# Patient Record
Sex: MALE | NOT HISPANIC OR LATINO | URBAN - METROPOLITAN AREA
[De-identification: names, ages, dates, MRNs, and addresses within clinical notes are randomized per-mention and may not be internally consistent; named-entity substitution may affect disease eponyms.]

---

## 2017-09-29 ENCOUNTER — EMERGENCY (EMERGENCY)
Facility: HOSPITAL | Age: 52
LOS: 1 days | Discharge: HOME | End: 2017-09-29
Attending: EMERGENCY MEDICINE
Payer: SELF-PAY

## 2017-09-29 VITALS
SYSTOLIC BLOOD PRESSURE: 106 MMHG | HEART RATE: 97 BPM | OXYGEN SATURATION: 96 % | RESPIRATION RATE: 16 BRPM | TEMPERATURE: 98 F | WEIGHT: 169.98 LBS | HEIGHT: 71 IN | DIASTOLIC BLOOD PRESSURE: 75 MMHG

## 2017-09-29 DIAGNOSIS — Z72.89 OTHER PROBLEMS RELATED TO LIFESTYLE: ICD-10-CM

## 2017-09-29 DIAGNOSIS — R20.0 ANESTHESIA OF SKIN: ICD-10-CM

## 2017-09-29 DIAGNOSIS — F17.210 NICOTINE DEPENDENCE, CIGARETTES, UNCOMPLICATED: ICD-10-CM

## 2017-09-29 PROBLEM — Z00.00 ENCOUNTER FOR PREVENTIVE HEALTH EXAMINATION: Status: ACTIVE | Noted: 2017-09-29

## 2017-09-29 LAB
ANION GAP SERPL CALC-SCNC: 7 MMOL/L — SIGNIFICANT CHANGE UP (ref 5–17)
BASOPHILS # BLD AUTO: 0.1 K/UL — SIGNIFICANT CHANGE UP (ref 0–0.2)
BASOPHILS NFR BLD AUTO: 1.4 % — SIGNIFICANT CHANGE UP (ref 0–2)
BUN SERPL-MCNC: 8 MG/DL — SIGNIFICANT CHANGE UP (ref 7–18)
CALCIUM SERPL-MCNC: 9.1 MG/DL — SIGNIFICANT CHANGE UP (ref 8.4–10.5)
CHLORIDE SERPL-SCNC: 105 MMOL/L — SIGNIFICANT CHANGE UP (ref 96–108)
CO2 SERPL-SCNC: 28 MMOL/L — SIGNIFICANT CHANGE UP (ref 22–31)
CREAT SERPL-MCNC: 0.76 MG/DL — SIGNIFICANT CHANGE UP (ref 0.5–1.3)
EOSINOPHIL # BLD AUTO: 0.4 K/UL — SIGNIFICANT CHANGE UP (ref 0–0.5)
EOSINOPHIL NFR BLD AUTO: 4.2 % — SIGNIFICANT CHANGE UP (ref 0–6)
GLUCOSE SERPL-MCNC: 104 MG/DL — HIGH (ref 70–99)
HCT VFR BLD CALC: 47.2 % — SIGNIFICANT CHANGE UP (ref 39–50)
HGB BLD-MCNC: 15.3 G/DL — SIGNIFICANT CHANGE UP (ref 13–17)
LYMPHOCYTES # BLD AUTO: 3 K/UL — SIGNIFICANT CHANGE UP (ref 1–3.3)
LYMPHOCYTES # BLD AUTO: 34.9 % — SIGNIFICANT CHANGE UP (ref 13–44)
MAGNESIUM SERPL-MCNC: 2 MG/DL — SIGNIFICANT CHANGE UP (ref 1.6–2.6)
MCHC RBC-ENTMCNC: 31.4 PG — SIGNIFICANT CHANGE UP (ref 27–34)
MCHC RBC-ENTMCNC: 32.4 GM/DL — SIGNIFICANT CHANGE UP (ref 32–36)
MCV RBC AUTO: 96.7 FL — SIGNIFICANT CHANGE UP (ref 80–100)
MONOCYTES # BLD AUTO: 0.6 K/UL — SIGNIFICANT CHANGE UP (ref 0–0.9)
MONOCYTES NFR BLD AUTO: 7.2 % — SIGNIFICANT CHANGE UP (ref 2–14)
NEUTROPHILS # BLD AUTO: 4.4 K/UL — SIGNIFICANT CHANGE UP (ref 1.8–7.4)
NEUTROPHILS NFR BLD AUTO: 52.4 % — SIGNIFICANT CHANGE UP (ref 43–77)
PHOSPHATE SERPL-MCNC: 2.9 MG/DL — SIGNIFICANT CHANGE UP (ref 2.5–4.5)
PLATELET # BLD AUTO: 196 K/UL — SIGNIFICANT CHANGE UP (ref 150–400)
POTASSIUM SERPL-MCNC: 3.8 MMOL/L — SIGNIFICANT CHANGE UP (ref 3.5–5.3)
POTASSIUM SERPL-SCNC: 3.8 MMOL/L — SIGNIFICANT CHANGE UP (ref 3.5–5.3)
RBC # BLD: 4.88 M/UL — SIGNIFICANT CHANGE UP (ref 4.2–5.8)
RBC # FLD: 12.6 % — SIGNIFICANT CHANGE UP (ref 10.3–14.5)
SODIUM SERPL-SCNC: 140 MMOL/L — SIGNIFICANT CHANGE UP (ref 135–145)
WBC # BLD: 8.5 K/UL — SIGNIFICANT CHANGE UP (ref 3.8–10.5)
WBC # FLD AUTO: 8.5 K/UL — SIGNIFICANT CHANGE UP (ref 3.8–10.5)

## 2017-09-29 PROCEDURE — 85027 COMPLETE CBC AUTOMATED: CPT

## 2017-09-29 PROCEDURE — 99053 MED SERV 10PM-8AM 24 HR FAC: CPT

## 2017-09-29 PROCEDURE — 83735 ASSAY OF MAGNESIUM: CPT

## 2017-09-29 PROCEDURE — 84100 ASSAY OF PHOSPHORUS: CPT

## 2017-09-29 PROCEDURE — 99284 EMERGENCY DEPT VISIT MOD MDM: CPT | Mod: 25

## 2017-09-29 PROCEDURE — 70450 CT HEAD/BRAIN W/O DYE: CPT

## 2017-09-29 PROCEDURE — 70450 CT HEAD/BRAIN W/O DYE: CPT | Mod: 26

## 2017-09-29 PROCEDURE — 93005 ELECTROCARDIOGRAM TRACING: CPT

## 2017-09-29 PROCEDURE — 80048 BASIC METABOLIC PNL TOTAL CA: CPT

## 2017-09-29 RX ADMIN — Medication 40 MILLIGRAM(S): at 22:46

## 2017-09-29 NOTE — ED PROVIDER NOTE - OBJECTIVE STATEMENT
55 y/o M pt with no significant PMHx presents to ED c/o paresthesia to right arm x 2 days. Pt admits to drinking 1-2 beers a day. Pt denies fever, chills, shortness of breath, cough, chest pain, palpitations, nausea, vomiting, diarrhea, abd pain, dysuria, urinary frequency, hematuria, recent neck injury, or any other complaints. NKDA.

## 2017-09-29 NOTE — ED PROVIDER NOTE - CHPI ED SYMPTOMS NEG
on fever, chills, shortness of breath, cough, chest pain, palpitations, no nausea, no vomiting, no diarrhea, no abd pain, no dysuria, no urinary frequency, no hematuria

## 2017-09-29 NOTE — ED PROVIDER NOTE - PHYSICAL EXAMINATION
Kernig and Brudzinski signs area negative, no petechiae, no photophobia, no dysarthria, no facial asymmetry, no focal deficits. distal radial and ulnar pulses intact, cap refill < 2 seconds, full range of motion of arm +elbow flexion/extension/supination and pronation intact, +flexion and extension of all digits at DIP and PIP. Full strength and sensation intact.

## 2017-09-30 VITALS
HEART RATE: 81 BPM | DIASTOLIC BLOOD PRESSURE: 90 MMHG | OXYGEN SATURATION: 98 % | TEMPERATURE: 98 F | RESPIRATION RATE: 18 BRPM | SYSTOLIC BLOOD PRESSURE: 149 MMHG

## 2017-10-03 ENCOUNTER — INPATIENT (INPATIENT)
Facility: HOSPITAL | Age: 52
LOS: 5 days | Discharge: HOME | DRG: 60 | End: 2017-10-09
Attending: INTERNAL MEDICINE | Admitting: INTERNAL MEDICINE
Payer: MEDICAID

## 2017-10-03 VITALS
SYSTOLIC BLOOD PRESSURE: 127 MMHG | TEMPERATURE: 98 F | WEIGHT: 169.98 LBS | HEART RATE: 108 BPM | DIASTOLIC BLOOD PRESSURE: 97 MMHG | HEIGHT: 71.5 IN | OXYGEN SATURATION: 97 % | RESPIRATION RATE: 16 BRPM

## 2017-10-03 DIAGNOSIS — Z29.9 ENCOUNTER FOR PROPHYLACTIC MEASURES, UNSPECIFIED: ICD-10-CM

## 2017-10-03 DIAGNOSIS — R27.8 OTHER LACK OF COORDINATION: ICD-10-CM

## 2017-10-03 DIAGNOSIS — R20.2 PARESTHESIA OF SKIN: ICD-10-CM

## 2017-10-03 LAB
ALBUMIN SERPL ELPH-MCNC: 4.3 G/DL — SIGNIFICANT CHANGE UP (ref 3.5–5)
ALP SERPL-CCNC: 63 U/L — SIGNIFICANT CHANGE UP (ref 40–120)
ALT FLD-CCNC: 40 U/L DA — SIGNIFICANT CHANGE UP (ref 10–60)
ANION GAP SERPL CALC-SCNC: 8 MMOL/L — SIGNIFICANT CHANGE UP (ref 5–17)
APTT BLD: 28.7 SEC — SIGNIFICANT CHANGE UP (ref 27.5–37.4)
AST SERPL-CCNC: 29 U/L — SIGNIFICANT CHANGE UP (ref 10–40)
BASOPHILS # BLD AUTO: 0.1 K/UL — SIGNIFICANT CHANGE UP (ref 0–0.2)
BASOPHILS NFR BLD AUTO: 1.2 % — SIGNIFICANT CHANGE UP (ref 0–2)
BILIRUB SERPL-MCNC: 0.3 MG/DL — SIGNIFICANT CHANGE UP (ref 0.2–1.2)
BUN SERPL-MCNC: 9 MG/DL — SIGNIFICANT CHANGE UP (ref 7–18)
CALCIUM SERPL-MCNC: 9.1 MG/DL — SIGNIFICANT CHANGE UP (ref 8.4–10.5)
CHLORIDE SERPL-SCNC: 101 MMOL/L — SIGNIFICANT CHANGE UP (ref 96–108)
CK SERPL-CCNC: 52 U/L — SIGNIFICANT CHANGE UP (ref 35–232)
CO2 SERPL-SCNC: 28 MMOL/L — SIGNIFICANT CHANGE UP (ref 22–31)
CREAT SERPL-MCNC: 0.65 MG/DL — SIGNIFICANT CHANGE UP (ref 0.5–1.3)
EOSINOPHIL # BLD AUTO: 0 K/UL — SIGNIFICANT CHANGE UP (ref 0–0.5)
EOSINOPHIL NFR BLD AUTO: 0.4 % — SIGNIFICANT CHANGE UP (ref 0–6)
GLUCOSE SERPL-MCNC: 85 MG/DL — SIGNIFICANT CHANGE UP (ref 70–99)
HCT VFR BLD CALC: 47 % — SIGNIFICANT CHANGE UP (ref 39–50)
HGB BLD-MCNC: 15.6 G/DL — SIGNIFICANT CHANGE UP (ref 13–17)
INR BLD: 0.99 RATIO — SIGNIFICANT CHANGE UP (ref 0.88–1.16)
LYMPHOCYTES # BLD AUTO: 19.9 % — SIGNIFICANT CHANGE UP (ref 13–44)
LYMPHOCYTES # BLD AUTO: 2.1 K/UL — SIGNIFICANT CHANGE UP (ref 1–3.3)
MAGNESIUM SERPL-MCNC: 1.9 MG/DL — SIGNIFICANT CHANGE UP (ref 1.6–2.6)
MCHC RBC-ENTMCNC: 31.8 PG — SIGNIFICANT CHANGE UP (ref 27–34)
MCHC RBC-ENTMCNC: 33.2 GM/DL — SIGNIFICANT CHANGE UP (ref 32–36)
MCV RBC AUTO: 96.1 FL — SIGNIFICANT CHANGE UP (ref 80–100)
MONOCYTES # BLD AUTO: 0.9 K/UL — SIGNIFICANT CHANGE UP (ref 0–0.9)
MONOCYTES NFR BLD AUTO: 9 % — SIGNIFICANT CHANGE UP (ref 2–14)
NEUTROPHILS # BLD AUTO: 7.3 K/UL — SIGNIFICANT CHANGE UP (ref 1.8–7.4)
NEUTROPHILS NFR BLD AUTO: 69.6 % — SIGNIFICANT CHANGE UP (ref 43–77)
PLATELET # BLD AUTO: 215 K/UL — SIGNIFICANT CHANGE UP (ref 150–400)
POTASSIUM SERPL-MCNC: 4 MMOL/L — SIGNIFICANT CHANGE UP (ref 3.5–5.3)
POTASSIUM SERPL-SCNC: 4 MMOL/L — SIGNIFICANT CHANGE UP (ref 3.5–5.3)
PROT SERPL-MCNC: 7.7 G/DL — SIGNIFICANT CHANGE UP (ref 6–8.3)
PROTHROM AB SERPL-ACNC: 10.8 SEC — SIGNIFICANT CHANGE UP (ref 9.8–12.7)
RBC # BLD: 4.89 M/UL — SIGNIFICANT CHANGE UP (ref 4.2–5.8)
RBC # FLD: 12.6 % — SIGNIFICANT CHANGE UP (ref 10.3–14.5)
SODIUM SERPL-SCNC: 137 MMOL/L — SIGNIFICANT CHANGE UP (ref 135–145)
TROPONIN I SERPL-MCNC: <0.015 NG/ML — SIGNIFICANT CHANGE UP (ref 0–0.04)
WBC # BLD: 10.5 K/UL — SIGNIFICANT CHANGE UP (ref 3.8–10.5)
WBC # FLD AUTO: 10.5 K/UL — SIGNIFICANT CHANGE UP (ref 3.8–10.5)

## 2017-10-03 PROCEDURE — 70450 CT HEAD/BRAIN W/O DYE: CPT | Mod: 26

## 2017-10-03 PROCEDURE — 99285 EMERGENCY DEPT VISIT HI MDM: CPT

## 2017-10-03 PROCEDURE — 71020: CPT | Mod: 26

## 2017-10-03 RX ORDER — ASPIRIN/CALCIUM CARB/MAGNESIUM 324 MG
162 TABLET ORAL ONCE
Qty: 0 | Refills: 0 | Status: COMPLETED | OUTPATIENT
Start: 2017-10-03 | End: 2017-10-03

## 2017-10-03 RX ORDER — ASPIRIN/CALCIUM CARB/MAGNESIUM 324 MG
81 TABLET ORAL DAILY
Qty: 0 | Refills: 0 | Status: DISCONTINUED | OUTPATIENT
Start: 2017-10-03 | End: 2017-10-09

## 2017-10-03 RX ORDER — ATORVASTATIN CALCIUM 80 MG/1
40 TABLET, FILM COATED ORAL AT BEDTIME
Qty: 0 | Refills: 0 | Status: DISCONTINUED | OUTPATIENT
Start: 2017-10-03 | End: 2017-10-09

## 2017-10-03 RX ADMIN — Medication 162 MILLIGRAM(S): at 19:24

## 2017-10-03 RX ADMIN — ATORVASTATIN CALCIUM 40 MILLIGRAM(S): 80 TABLET, FILM COATED ORAL at 23:03

## 2017-10-03 NOTE — H&P ADULT - ASSESSMENT
53 y/o M with no significant PMHx presents to ED c/o worsening numbness, tingling and weakness on R side x 3 days. Pt was seen here 3 days ago for tingling in R hand only and was discharged w/ steroid medication he has been compliant with. Pt now has numbness and tingling in his R arm, shoulder, side, and thigh as well w/ some weakness in his R hand - pt is unable to make a fist.    IN ER troponin T1 normal, EKG normal sinus, CBC, BMP normal, CT head here is slight asymmetry of the lateral ventricles with the left lateral ventricle being slightly cabrera than the right lateral ventricle. No acute blood collections or subarachnoid hemorrhages are evident.   patient was admitted to tele to r/o stroke

## 2017-10-03 NOTE — ED ADULT TRIAGE NOTE - CHIEF COMPLAINT QUOTE
C/o "worsening right arm numbness and I feel it more in my side and the numbness is in my legs more now since I got up at noon, I was here last Friday for same reason. It started last Thursday."

## 2017-10-03 NOTE — ED PROVIDER NOTE - MEDICAL DECISION MAKING DETAILS
51 y/o M presenting dysmetria and paresthesias, worsening, pt was here 3 days ago. Admit to TELE for stroke evaluation. Pt's last drink was at midnight. 53 y/o M presenting with paresthesia, worsening, pt was here 3 days ago, now with dysmetria on exam. Admit to TELE for stroke evaluation. Pt's last drink was at midnight. ddx also includes alcohol induced neuropathy

## 2017-10-03 NOTE — ED PROVIDER NOTE - OBJECTIVE STATEMENT
51 y/o M with no significant PMHx presents to ED c/o worsening numbness, tingling and weakness on R side x 3 days. Pt was seen here 3 days ago for tingling in R hand only and was discharged w/ medication he has been compliant with. Pt now has numbness and tingling in his R arm, shoulder, side, and thigh as well w/ some weakness in his R hand - pt is unable to make a fist. Pt denies any trauma. Denies any chest pain, SOB, incontinence, back pain, neck pain, fever, visual changes, facial involvement or any other complaints. 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 Denies ever having had symptoms of withdrawal. 51 y/o M with no significant PMHx presents to ED c/o worsening numbness, tingling and weakness on R side x 3 days. Pt was seen here 3 days ago for tingling in R hand only and was discharged w/ steroid medication he has been compliant with. Pt now has numbness and tingling in his R arm, shoulder, side, and thigh as well w/ some weakness in his R hand - pt is unable to make a fist. Pt denies any trauma. Denies any chest pain, SOB, incontinence, back pain, neck pain, fever, visual changes, facial involvement or any other complaints. 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 Denies ever having had symptoms of withdrawal.

## 2017-10-03 NOTE — ED PROVIDER NOTE - CHPI ED SYMPTOMS NEG
no back pain, no neck pain, no chest pain, no shortness of breath, no incontinence, no visual changes/no fever

## 2017-10-03 NOTE — H&P ADULT - ATTENDING COMMENTS
Seen and examined. I feel the same. MRI and MRA Head noted. MRI cervical spine and Neurology consult pending.

## 2017-10-03 NOTE — ED ADULT NURSE REASSESSMENT NOTE - NS ED NURSE REASSESS COMMENT FT1
Pt. stated that he feels the same. Pt. is stable for transport to [address] [place] [address]. Night medication to be given by MERY Sheth. No signs of distress noted. No complaints voiced.

## 2017-10-03 NOTE — H&P ADULT - HISTORY OF PRESENT ILLNESS
53 y/o M with no significant PMHx presents to ED c/o worsening numbness, tingling and weakness on R side x 3 days. Pt was seen here 3 days ago for tingling in R hand ct head was neg for stroke patient was  discharged w/ steroid medication he has been compliant with. Pt now has numbness and tingling in his R arm, shoulder, side, and thigh as well w/ some weakness in his R hand - pt is unable to make a fist. patient had similar episode 5 years ago, resolved after steroids.  Pt denies any trauma. Denies any chest pain, SOB, incontinence, back pain, neck pain, fever, visual changes, facial involvement or any other complaints. 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 patient is an active smoker 1 PPD since 10 years Denies ever having had symptoms of withdrawal.

## 2017-10-03 NOTE — H&P ADULT - NEUROLOGICAL DETAILS
responds to verbal commands/cranial nerves intact/superficial reflexes intact/sensation intact/alert and oriented x 3

## 2017-10-03 NOTE — H&P ADULT - PROBLEM SELECTOR PLAN 1
likely stroke vs neuropathy vs cervical radiculopathy    - patient is active smoker , alcoholic   - Ct head finding as above   - patient has unilateral focal neurological deficient     - f/u tsh, A1c patient has right side weakness, numbness, and paraesthesia  -patient is an active smoker  - Stroke score ABCD2 is 4 , patient at moderate risk for stroke   - will get MRI and MRA of head and MRI neck  but patient had tooth implants and patient does not know if  its MRI compatible   - will r/o stroke vs neuropathy vs cervical radiculopathy    - Ct head finding as above   - f/u tsh, A1c, lyme serology  -f/u HIv  - aspirin and statin patient has right side weakness, numbness, and paraesthesia  -patient is an active smoker  - Stroke score ABCD2 is 4 , patient at moderate risk for stroke   - will get MRI and MRA of head and MRI neck  but patient had tooth implants and patient does not know if  its MRI compatible   - will r/o stroke vs neuropathy vs cervical radiculopathy    - Ct head finding as above   - f/u tsh, A1c, lyme serology  -f/u HIv  - aspirin and statin  -Dr Garza neurology consulted

## 2017-10-03 NOTE — H&P ADULT - NSHPLABSRESULTS_GEN_ALL_CORE
ekg normal sinus   CBC Full  -  ( 03 Oct 2017 17:32 )  WBC Count : 10.5 K/uL  Hemoglobin : 15.6 g/dL  Hematocrit : 47.0 %  Platelet Count - Automated : 215 K/uL  Mean Cell Volume : 96.1 fl  Mean Cell Hemoglobin : 31.8 pg  Mean Cell Hemoglobin Concentration : 33.2 gm/dL  Auto Neutrophil # : 7.3 K/uL  Auto Lymphocyte # : 2.1 K/uL  Auto Monocyte # : 0.9 K/uL  Auto Eosinophil # : 0.0 K/uL  Auto Basophil # : 0.1 K/uL  Auto Neutrophil % : 69.6 %  Auto Lymphocyte % : 19.9 %  Auto Monocyte % : 9.0 %  Auto Eosinophil % : 0.4 %  Auto Basophil % : 1.2 %

## 2017-10-04 ENCOUNTER — TRANSCRIPTION ENCOUNTER (OUTPATIENT)
Age: 52
End: 2017-10-04

## 2017-10-04 DIAGNOSIS — G57.11 MERALGIA PARESTHETICA, RIGHT LOWER LIMB: ICD-10-CM

## 2017-10-04 DIAGNOSIS — G56.31 LESION OF RADIAL NERVE, RIGHT UPPER LIMB: ICD-10-CM

## 2017-10-04 LAB
B BURGDOR C6 AB SER-ACNC: POSITIVE
B BURGDOR IGG+IGM SER-ACNC: 1.87 INDEX — HIGH (ref 0.01–0.89)
CHOLEST SERPL-MCNC: 174 MG/DL — SIGNIFICANT CHANGE UP (ref 10–199)
CK MB BLD-MCNC: 2.7 % — SIGNIFICANT CHANGE UP (ref 0–3.5)
CK MB CFR SERPL CALC: 1.2 NG/ML — SIGNIFICANT CHANGE UP (ref 0–3.6)
CK SERPL-CCNC: 44 U/L — SIGNIFICANT CHANGE UP (ref 35–232)
FOLATE SERPL-MCNC: 17.1 NG/ML — SIGNIFICANT CHANGE UP (ref 4.8–24.2)
HBA1C BLD-MCNC: 5.5 % — SIGNIFICANT CHANGE UP (ref 4–5.6)
HCV AB S/CO SERPL IA: 0.11 S/CO — SIGNIFICANT CHANGE UP
HCV AB SERPL-IMP: SIGNIFICANT CHANGE UP
HDLC SERPL-MCNC: 70 MG/DL — SIGNIFICANT CHANGE UP (ref 40–125)
HIV 1+2 AB+HIV1 P24 AG SERPL QL IA: SIGNIFICANT CHANGE UP
LIPID PNL WITH DIRECT LDL SERPL: 88 MG/DL — SIGNIFICANT CHANGE UP
TOTAL CHOLESTEROL/HDL RATIO MEASUREMENT: 2.5 RATIO — LOW (ref 3.4–9.6)
TRIGL SERPL-MCNC: 82 MG/DL — SIGNIFICANT CHANGE UP (ref 10–149)
TROPONIN I SERPL-MCNC: <0.015 NG/ML — SIGNIFICANT CHANGE UP (ref 0–0.04)
TSH SERPL-MCNC: 1.01 UU/ML — SIGNIFICANT CHANGE UP (ref 0.34–4.82)
VIT B12 SERPL-MCNC: 750 PG/ML — SIGNIFICANT CHANGE UP (ref 243–894)
VIT B12 SERPL-MCNC: 761 PG/ML — SIGNIFICANT CHANGE UP (ref 243–894)

## 2017-10-04 PROCEDURE — 70551 MRI BRAIN STEM W/O DYE: CPT | Mod: 26

## 2017-10-04 PROCEDURE — 70544 MR ANGIOGRAPHY HEAD W/O DYE: CPT | Mod: 26,59

## 2017-10-04 PROCEDURE — 99255 IP/OBS CONSLTJ NEW/EST HI 80: CPT

## 2017-10-04 PROCEDURE — 72141 MRI NECK SPINE W/O DYE: CPT | Mod: 26

## 2017-10-04 RX ADMIN — ATORVASTATIN CALCIUM 40 MILLIGRAM(S): 80 TABLET, FILM COATED ORAL at 21:23

## 2017-10-04 RX ADMIN — Medication 81 MILLIGRAM(S): at 11:44

## 2017-10-04 NOTE — DISCHARGE NOTE ADULT - CARE PLAN
Principal Discharge DX:	Multiple sclerosis  Goal:	appropriate management  Instructions for follow-up, activity and diet:	Complete Prednisone taper and followup at [address] [place], [place] on Tuesday for followup care and treatment by a neurologist.

## 2017-10-04 NOTE — CONSULT NOTE ADULT - SUBJECTIVE AND OBJECTIVE BOX
Neurology consult    PATTI ROONEY  52yMale    HPI:  53 y/o M with no significant PMHx presents to ED c/o worsening numbness, tingling and weakness on R side x 3 days. He woke after having alcohol the previous day. Pt was seen here 3 days ago for tingling in R hand ct head was neg for stroke patient was  discharged w/ steroid medication he has been compliant with. Pt now has numbness and tingling in his R arm, shoulder, side, and thigh as well w/ some weakness in his R hand - pt is unable to make a fist. patient had similar episode 5 years ago, resolved after steroids.  Pt denies any trauma. Denies any chest pain, SOB, incontinence, back pain, neck pain, fever, visual changes, facial involvement or any other complaints. 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 patient is an active smoker 1 PPD since 10 years Denies ever having had symptoms of withdrawal. (03 Oct 2017 20:53)          MEDICATIONS    aspirin  chewable 81 milliGRAM(s) Oral daily  atorvastatin 40 milliGRAM(s) Oral at bedtime         Family history: No history of dementia, strokes, or seizures   FAMILY HISTORY:    SOCIAL HISTORY -- No history of tobacco or alcohol use     Allergies    No Known Allergies    Intolerances        Height (cm): 181.61 (10-03 @ 15:43)  Weight (kg): 79.2 (10-03 @ 22:45)  BMI (kg/m2): 24 (10-03 @ 22:45)    Vital Signs Last 24 Hrs  T(C): 37.1 (04 Oct 2017 11:27), Max: 37.1 (04 Oct 2017 04:40)  T(F): 98.7 (04 Oct 2017 11:27), Max: 98.7 (04 Oct 2017 04:40)  HR: 71 (04 Oct 2017 11:27) (71 - 110)  BP: 104/67 (04 Oct 2017 11:27) (100/65 - 135/99)  BP(mean): --  RR: 16 (04 Oct 2017 11:27) (16 - 18)  SpO2: 97% (04 Oct 2017 11:27) (95% - 100%)      REVIEW OF SYSTEMS:    Constitutional: No fever, chills, fatigue, weakness  Eyes: no eye pain, visual disturbances, or discharge  ENT:  No difficulty hearing, tinnitus, vertigo; No sinus or throat pain  Neck: No pain or stiffness  Respiratory: No cough, dyspnea, wheezing   Cardiovascular: No chest pain, palpitations,   Gastrointestinal: No abdominal or epigastric pain. No nausea, vomiting  No diarrhea or constipation.   Genitourinary: No dysuria, frequency, hematuria or incontinence  Neurological: No headaches, lightheadedness, vertigo,  or tremors. He has right hand  weakness and tingling in right hand and right thigh.  Psychiatric: No depression, anxiety, mood swings or difficulty sleeping  Musculoskeletal: No joint pain or swelling; No muscle, back or extremity pain  Skin: No itching, burning, rashes or lesions   Lymph Nodes: No enlarged glands  Endocrine: No heat or cold intolerance; No hair loss, No h/o diabetes or thyroid dysfunction  Allergy and Immunologic: No hives or eczema    On Neurological Examination:    Mental Status - Patient is alert, awake, oriented X3. Confused Dementia Lethargic .     Follows commands well and able to answer questions appropriately. Mood and affect  normal  Follow simple commands  Follow complex commands    Speech -   Fluent    Dysarthria  Aphasia                              Cranial Nerves - Pupils 3 mm equal and reactive to light,   extraocular eye movements intact.   Cranial III, IV, VI- extraocular movement are normal.  Cranial V- normal facial sensation  Cranial VII- normal facial symmetry  Cranial VIII- Normal  Cranial X- Normal  Cranial XI- normal shoulder shrugs  Cranial XII- Normal    Motor Exam -   Right upper- Right hand  weakness 4/5, Right elbow mild weakness  Left upper- 5/5  Right lower- 5/5  Left lower- 5/5     With stimuli positive movement of all 4 extremities    Muscle tone - is normal all over. No asymmetry is seen.      Sensory    Bilateral intact to light touch    Gait -  normal  ataxia     GENERAL Exam:     Nontoxic , No Acute Distress   	  HEENT:  normocephalic, atraumatic  		  LUNGS:	Clear bilaterally  No Wheeze  Decreased bilaterally  	  HEART:	 Normal S1S2   No murmur RRR        	  GI/ ABDOMEN:  Soft  Non tender    EXTREMITIES:   No Edema  No Clubbing  No Cyanosis No Edema    MUSCULOSKELETAL: Normal Range of Motion  	   SKIN:      Normal   No Ecchymosis               LABS:  CBC Full  -  ( 03 Oct 2017 17:32 )  WBC Count : 10.5 K/uL  Hemoglobin : 15.6 g/dL  Hematocrit : 47.0 %  Platelet Count - Automated : 215 K/uL  Mean Cell Volume : 96.1 fl  Mean Cell Hemoglobin : 31.8 pg  Mean Cell Hemoglobin Concentration : 33.2 gm/dL  Auto Neutrophil # : 7.3 K/uL  Auto Lymphocyte # : 2.1 K/uL  Auto Monocyte # : 0.9 K/uL  Auto Eosinophil # : 0.0 K/uL  Auto Basophil # : 0.1 K/uL  Auto Neutrophil % : 69.6 %  Auto Lymphocyte % : 19.9 %  Auto Monocyte % : 9.0 %  Auto Eosinophil % : 0.4 %  Auto Basophil % : 1.2 %      10-03    137  |  101  |  9   ----------------------------<  85  4.0   |  28  |  0.65    Ca    9.1      03 Oct 2017 17:32  Mg     1.9     10-03    TPro  7.7  /  Alb  4.3  /  TBili  0.3  /  DBili  x   /  AST  29  /  ALT  40  /  AlkPhos  63  10-03    Hemoglobin A1C: Hemoglobin A1C, Whole Blood: 5.5 % (10-04 @ 09:31)    Lipid Panel 10-04 @ 07:57  Total Cholesterol, Serum 174  LDL 88  Triglycerides 82    LIVER FUNCTIONS - ( 03 Oct 2017 17:32 )  Alb: 4.3 g/dL / Pro: 7.7 g/dL / ALK PHOS: 63 U/L / ALT: 40 U/L DA / AST: 29 U/L / GGT: x           Vitamin B12 Vitamin B12, Serum: 750 pg/mL (10-04 @ 09:31)  Vitamin B12, Serum: 761 pg/mL (10-03 @ 23:44)    PT/INR - ( 03 Oct 2017 17:32 )   PT: 10.8 sec;   INR: 0.99 ratio         PTT - ( 03 Oct 2017 17:32 )  PTT:28.7 sec      RADIOLOGY  MRI-brain MRA-head -normal  MRI-C spine is pending    EKG      A & P  1) 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    3) No evidence of acute stroke at this time

## 2017-10-04 NOTE — DISCHARGE NOTE ADULT - PATIENT PORTAL LINK FT
“You can access the [facility] Patient Portal, offered by [facility], by registering with the following website: [email or web address]”

## 2017-10-04 NOTE — PHYSICAL THERAPY INITIAL EVALUATION ADULT - CRITERIA FOR SKILLED THERAPEUTIC INTERVENTIONS
impairments found/rehab potential/predicted duration of therapy intervention/therapy frequency/functional limitations in following categories/anticipated discharge recommendation

## 2017-10-04 NOTE — DISCHARGE NOTE ADULT - MEDICATION SUMMARY - MEDICATIONS TO TAKE
I will START or STAY ON the medications listed below when I get home from the hospital:    Physical therapy Out Patient  -- ABBIE  -- Indication: For abbie    predniSONE 10 mg oral tablet  -- Prednisone taper:  Take 40 mg X 3 days; then 30 mg X 3 days; then 20 mg X 3 days; then 10 mg X 3 days; then STOP.    ABBIE MEDICATION.  -- It is very important that you take or use this exactly as directed.  Do not skip doses or discontinue unless directed by your doctor.  Obtain medical advice before taking any non-prescription drugs as some may affect the action of this medication.  Take with food or milk.    -- Indication: For abbie medication for MS

## 2017-10-04 NOTE — PHYSICAL THERAPY INITIAL EVALUATION ADULT - PERTINENT HX OF CURRENT PROBLEM, REHAB EVAL
Pt. presented to ED c/o worsening numbness, tingling and weakness on R side x 3 days.  MRI brain negative for bleed; MRI of cervical spine revealed nonspecific multilevel spinal cord lesions.

## 2017-10-04 NOTE — PHYSICAL THERAPY INITIAL EVALUATION ADULT - GENERAL OBSERVATIONS, REHAB EVAL
Pt. found lying supine in NAD; presents with right UE weakness and numbness. Pt. on cardiac monitoring. Pt.'s dominant hand is his left.

## 2017-10-04 NOTE — PHYSICAL THERAPY INITIAL EVALUATION ADULT - ACTIVE RANGE OF MOTION EXAMINATION, REHAB EVAL
except for right shoulder, elbow, wrist and fingers limited due to weakness/no Active ROM deficits were identified

## 2017-10-04 NOTE — PROGRESS NOTE ADULT - SUBJECTIVE AND OBJECTIVE BOX
PGY 1 Note discussed with supervising resident and primary attending    Patient is a 52y old  Male who presents with a chief complaint of numbness, tingling and paraesthesia of the right side of the body (04 Oct 2017 07:11)      INTERVAL HPI/OVERNIGHT EVENTS: offers no new complaints; Pt is complaining of Rt arm numbness and tingling in the Rt leg.     MEDICATIONS  (STANDING):  aspirin  chewable 81 milliGRAM(s) Oral daily  atorvastatin 40 milliGRAM(s) Oral at bedtime      __________________________________________________  REVIEW OF SYSTEMS:    CONSTITUTIONAL: No fever,   EYES: no acute visual disturbances  NECK: No pain or stiffness  RESPIRATORY: No cough; No shortness of breath  CARDIOVASCULAR: No chest pain, no palpitations  GASTROINTESTINAL: No pain. No nausea or vomiting; No diarrhea   NEUROLOGICAL: No headache or numbness, no tremors  MUSCULOSKELETAL: Diffuse weakness in Rt arm and numbness in Rt LE  GENITOURINARY: no dysuria, no frequency, no hesitancy  PSYCHIATRY: no depression , no anxiety  ALL OTHER  ROS negative        Vital Signs Last 24 Hrs  T(C): 37.1 (04 Oct 2017 11:27), Max: 37.1 (04 Oct 2017 04:40)  T(F): 98.7 (04 Oct 2017 11:27), Max: 98.7 (04 Oct 2017 04:40)  HR: 71 (04 Oct 2017 11:27) (71 - 110)  BP: 104/67 (04 Oct 2017 11:27) (100/65 - 135/99)  BP(mean): --  RR: 16 (04 Oct 2017 11:27) (16 - 18)  SpO2: 97% (04 Oct 2017 11:27) (95% - 100%)    ________________________________________________  PHYSICAL EXAM:  GENERAL: NAD  HEENT: Normocephalic;  conjunctivae and sclerae clear; moist mucous membranes;   NECK : supple  CHEST/LUNG: Clear to auscultation bilaterally with good air entry   HEART: S1 S2  regular; no murmurs, gallops or rubs  ABDOMEN: Soft, Nontender, Nondistended; Bowel sounds present  EXTREMITIES: Numbness and weakness in Rt body with Wrist drop plus ulcer nerve involvement.   SKIN: warm and dry; no rash  NERVOUS SYSTEM:  Awake and alert; Oriented  to place, person and time ; no new deficits    _________________________________________________  LABS:                        15.6   10.5  )-----------( 215      ( 03 Oct 2017 17:32 )             47.0     10-03    137  |  101  |  9   ----------------------------<  85  4.0   |  28  |  0.65    Ca    9.1      03 Oct 2017 17:32  Mg     1.9     10-03    TPro  7.7  /  Alb  4.3  /  TBili  0.3  /  DBili  x   /  AST  29  /  ALT  40  /  AlkPhos  63  10-03    PT/INR - ( 03 Oct 2017 17:32 )   PT: 10.8 sec;   INR: 0.99 ratio         PTT - ( 03 Oct 2017 17:32 )  PTT:28.7 sec        RADIOLOGY & ADDITIONAL TESTS:    MRI SPINE: IMPRESSION:  Nonspecific multilevel spinal cord lesions as described above. Repeat   exam with contrast recommended as clinically warranted. Correlate   clinically for demyelinating disease.      Plan of care was discussed with patient and /or primary care giver; all questions and concerns were addressed and care was aligned with patient's wishes.

## 2017-10-04 NOTE — DISCHARGE NOTE ADULT - ADDITIONAL INSTRUCTIONS
outpatient PT and a Prednisone taper,as well as followup with a neurologist.  Patient does not have insurance and will be referred to [address] [place] [address] for followup treatment.  Patient is to call for an appointment at [phone].    You are being discharged with Outpatient PT and a Prednisone taper,as well as followup with a neurologist.    You will be referred to [address] [place] [address] for followup treatment on Tuesday.  Patient is to call for an appointment at [phone].

## 2017-10-04 NOTE — PROGRESS NOTE ADULT - ASSESSMENT
51 y/o M with no significant PMHx presents to ED c/o worsening numbness, tingling and weakness on R side x 3 days. Pt was seen here 3 days ago for tingling in R hand only and was discharged w/ steroid medication he has been compliant with. Pt now has numbness and tingling in his R arm, shoulder, side, and thigh as well w/ some weakness in his R hand - pt is unable to make a fist secondary to involvement of Ulnar and radial nerve.

## 2017-10-04 NOTE — PROGRESS NOTE ADULT - PROBLEM SELECTOR PLAN 1
patient has right side weakness, numbness, and paraesthesia  Possible Multiple Sclerosis  MRI/MRA of head is Normal  CT spine: Multiple level spinal cord involvement   F/u CT spine with Contrast  Normal TSH and A1c  C/w Aspirin and statin  Dr. Real on board patient has right side weakness, numbness, and paraesthesia  Possible Multiple Sclerosis  MRI/MRA of head is Normal  CT spine: Multiple level spinal cord involvement   F/u MRI spine with Contrast  Normal TSH and A1c  C/w Aspirin and statin  Dr. Real on board patient has right side weakness, numbness, and paraesthesia  Possible Multiple Sclerosis  MRI/MRA of head is Normal  MRI spine: Multiple level spinal cord involvement   F/u MRI spine with Contrast  Normal TSH and A1c  C/w Aspirin and statin  Dr. Real on board

## 2017-10-04 NOTE — DISCHARGE NOTE ADULT - PLAN OF CARE
appropriate management Complete Prednisone taper and followup at [address] [place], [place] on Tuesday for followup care and treatment by a neurologist.

## 2017-10-04 NOTE — DISCHARGE NOTE ADULT - HOSPITAL COURSE
51 y/o M with no significant PMHx presents to ED c/o worsening numbness, tingling and weakness on R side x 3 days. Pt was seen here 3 days ago for tingling in R hand only and was discharged w/ steroid medication which he has been compliant with. Pt now has numbness and tingling in his R arm, shoulder, side, and thigh as well w/ some weakness in his R hand - pt is unable to make a fist.    IN ER troponin T1 normal, EKG normal sinus, CBC, BMP normal, CT head here is slight asymmetry of the lateral ventricles, with the left lateral ventricle being slightly cabrera than the right lateral ventricle. No acute blood collections or subarachnoid hemorrhages are evident.     Patient was admitted to tele to r/o stroke.    Paresthesia.    - Ct head showed:  " Slight asymmetry of the lateral ventricles with the left lateral ventricle being slightly cabrera than the right lateral ventricle. No   acute blood collections or subarachnoid hemorrhages are evident.  - Stroke score ABCD2 is 4 , patient at moderate risk for stroke   - Aspirin/Statin   - Dr. Real, neurology,  consulted.   - MRI/MRA head were negative for acute process.  - Dr. Perkins, ID, consulted/r/o lyme disease  - Western bloc negative for lyme disease  - MRI of the cervical spine showed demyelinating disease  - Pt refused LP-CSF  - Solumedrol IV given and patient demonstrated improvement  - Dr. Real diagnosed patient with Relapsing Remitting MS  - PT recommended home with outpatient physical therapy    Prophylactic measure.    - VTE score1   no dvt or GI prophylaxis needed.     Attending cleared patient for discharge home with outpatient PT and a Prednisone taper,as well as followup with a neurologist.  Patient does not have insurance and will be referred to [address] for followup treatment.  Patient is to call for an appointment at [phone].

## 2017-10-05 DIAGNOSIS — A69.20 LYME DISEASE, UNSPECIFIED: ICD-10-CM

## 2017-10-05 DIAGNOSIS — G35 MULTIPLE SCLEROSIS: ICD-10-CM

## 2017-10-05 LAB
ANION GAP SERPL CALC-SCNC: 5 MMOL/L — SIGNIFICANT CHANGE UP (ref 5–17)
BUN SERPL-MCNC: 13 MG/DL — SIGNIFICANT CHANGE UP (ref 7–18)
CALCIUM SERPL-MCNC: 8.9 MG/DL — SIGNIFICANT CHANGE UP (ref 8.4–10.5)
CHLORIDE SERPL-SCNC: 101 MMOL/L — SIGNIFICANT CHANGE UP (ref 96–108)
CO2 SERPL-SCNC: 31 MMOL/L — SIGNIFICANT CHANGE UP (ref 22–31)
CREAT SERPL-MCNC: 0.78 MG/DL — SIGNIFICANT CHANGE UP (ref 0.5–1.3)
GLUCOSE SERPL-MCNC: 102 MG/DL — HIGH (ref 70–99)
HCT VFR BLD CALC: 48 % — SIGNIFICANT CHANGE UP (ref 39–50)
HGB BLD-MCNC: 15.4 G/DL — SIGNIFICANT CHANGE UP (ref 13–17)
MAGNESIUM SERPL-MCNC: 1.9 MG/DL — SIGNIFICANT CHANGE UP (ref 1.6–2.6)
MCHC RBC-ENTMCNC: 31.1 PG — SIGNIFICANT CHANGE UP (ref 27–34)
MCHC RBC-ENTMCNC: 32 GM/DL — SIGNIFICANT CHANGE UP (ref 32–36)
MCV RBC AUTO: 97.1 FL — SIGNIFICANT CHANGE UP (ref 80–100)
PHOSPHATE SERPL-MCNC: 3.3 MG/DL — SIGNIFICANT CHANGE UP (ref 2.5–4.5)
PLATELET # BLD AUTO: 212 K/UL — SIGNIFICANT CHANGE UP (ref 150–400)
POTASSIUM SERPL-MCNC: 3.7 MMOL/L — SIGNIFICANT CHANGE UP (ref 3.5–5.3)
POTASSIUM SERPL-SCNC: 3.7 MMOL/L — SIGNIFICANT CHANGE UP (ref 3.5–5.3)
RBC # BLD: 4.94 M/UL — SIGNIFICANT CHANGE UP (ref 4.2–5.8)
RBC # FLD: 12.3 % — SIGNIFICANT CHANGE UP (ref 10.3–14.5)
SODIUM SERPL-SCNC: 137 MMOL/L — SIGNIFICANT CHANGE UP (ref 135–145)
WBC # BLD: 7.2 K/UL — SIGNIFICANT CHANGE UP (ref 3.8–10.5)
WBC # FLD AUTO: 7.2 K/UL — SIGNIFICANT CHANGE UP (ref 3.8–10.5)

## 2017-10-05 PROCEDURE — 99233 SBSQ HOSP IP/OBS HIGH 50: CPT

## 2017-10-05 PROCEDURE — 72142 MRI NECK SPINE W/DYE: CPT | Mod: 26

## 2017-10-05 RX ORDER — ACETAMINOPHEN 500 MG
650 TABLET ORAL EVERY 6 HOURS
Qty: 0 | Refills: 0 | Status: DISCONTINUED | OUTPATIENT
Start: 2017-10-05 | End: 2017-10-09

## 2017-10-05 RX ADMIN — Medication 650 MILLIGRAM(S): at 04:53

## 2017-10-05 RX ADMIN — Medication 81 MILLIGRAM(S): at 12:09

## 2017-10-05 RX ADMIN — ATORVASTATIN CALCIUM 40 MILLIGRAM(S): 80 TABLET, FILM COATED ORAL at 21:22

## 2017-10-05 RX ADMIN — Medication 650 MILLIGRAM(S): at 02:52

## 2017-10-05 NOTE — PROGRESS NOTE ADULT - SUBJECTIVE AND OBJECTIVE BOX
PGY 1 Note discussed with supervising resident and primary attending    Patient is a 52y old  Male who presents with a chief complaint of numbness, tingling and paraesthesia of the right side of the body (04 Oct 2017 07:11)      INTERVAL HPI/OVERNIGHT EVENTS: offers no new complaints; Pt is still complaining of Rt sided weakness.    MEDICATIONS  (STANDING):  aspirin  chewable 81 milliGRAM(s) Oral daily  atorvastatin 40 milliGRAM(s) Oral at bedtime    MEDICATIONS  (PRN):  acetaminophen   Tablet. 650 milliGRAM(s) Oral every 6 hours PRN Moderate Pain (4 - 6)      __________________________________________________  REVIEW OF SYSTEMS:    CONSTITUTIONAL: No fever,   EYES: no acute visual disturbances  NECK: No pain or stiffness  RESPIRATORY: No cough; No shortness of breath  CARDIOVASCULAR: No chest pain, no palpitations  GASTROINTESTINAL: No pain. No nausea or vomiting; No diarrhea   NEUROLOGICAL: No headache or numbness, no tremors  MUSCULOSKELETAL: Diffuse weakness in Rt arm and numbness in Rt LE  GENITOURINARY: no dysuria, no frequency, no hesitancy  PSYCHIATRY: no depression , no anxiety  ALL OTHER  ROS negative        Vital Signs Last 24 Hrs  T(C): 36.7 (05 Oct 2017 12:05), Max: 37.1 (05 Oct 2017 05:08)  T(F): 98.1 (05 Oct 2017 12:05), Max: 98.7 (05 Oct 2017 05:08)  HR: 80 (05 Oct 2017 12:05) (64 - 82)  BP: 110/75 (05 Oct 2017 12:05) (110/75 - 118/78)  BP(mean): --  RR: 16 (05 Oct 2017 12:05) (16 - 16)  SpO2: 98% (05 Oct 2017 12:05) (96% - 100%)    ________________________________________________  PHYSICAL EXAM:  GENERAL: NAD  HEENT: Normocephalic;  conjunctivae and sclerae clear; moist mucous membranes;   NECK : supple  CHEST/LUNG: Clear to auscultation bilaterally with good air entry   HEART: S1 S2  regular; no murmurs, gallops or rubs  ABDOMEN: Soft, Nontender, Nondistended; Bowel sounds present  EXTREMITIES: Numbness and weakness in Rt body with Wrist drop plus ulcer nerve involvement.   SKIN: warm and dry; no rash  NERVOUS SYSTEM:  Awake and alert; Oriented  to place, person and time ; no new deficits    _________________________________________________  LABS:                        15.4   7.2   )-----------( 212      ( 05 Oct 2017 07:51 )             48.0     10-05    137  |  101  |  13  ----------------------------<  102<H>  3.7   |  31  |  0.78    Ca    8.9      05 Oct 2017 07:51  Phos  3.3     10-05  Mg     1.9     10-05    TPro  7.7  /  Alb  4.3  /  TBili  0.3  /  DBili  x   /  AST  29  /  ALT  40  /  AlkPhos  63  10-03    PT/INR - ( 03 Oct 2017 17:32 )   PT: 10.8 sec;   INR: 0.99 ratio         PTT - ( 03 Oct 2017 17:32 )  PTT:28.7 sec        RADIOLOGY & ADDITIONAL TESTS:    MRI C-Spine: Abnormally enhancing lesion in the dorsal cord at C1 level. Recommend   clinical correlation for demyelinating disease.    Lyme Titers: LYME IgG/IgM Antibodies Result: 1.87 Index (10.04.17 @ 09:31)        Plan of care was discussed with patient and /or primary care giver; all questions and concerns were addressed and care was aligned with patient's wishes.

## 2017-10-05 NOTE — PROGRESS NOTE ADULT - SUBJECTIVE AND OBJECTIVE BOX
Neurology Follow up note    Name  PATTI ROONEY      Interval History -  Pt got MRI-C spine which shows SONA enhancing lesion at C1 level.  On further questioning pt reported that 5 yrs ago he had similar symptoms and was treated with steroid for 3 days and then he got better. He never got any definitive diagnosis. his blood test for Lyme titer IgM/IgG are also positive. His symptoms of right hand/wrist weakness persisted. No new symptoms.      REVIEW OF SYSTEMS:    Constitutional: No fever, weight loss or fatigue  Eyes: No eye pain, visual disturbances, or discharge  ENMT:  No difficulty hearing, tinnitus, vertigo; No sinus or throat pain  Neck: No pain or stiffness  Breasts: No pain, masses or nipple discharge  Respiratory: No cough, wheezing, chills or hemoptysis  Cardiovascular: No chest pain, palpitations, shortness of breath, dizziness or leg swelling  Gastrointestinal: No abdominal or epigastric pain. No nausea, vomiting or hematemesis; No diarrhea or constipation. No melena or hematochezia.  Genitourinary: No dysuria, frequency, hematuria or incontinence  Rectal: No pain, hemorrhoids or incontinence  Neurological: No headaches, memory loss. right arm/hand weakness. right thigh numbness  Skin: No itching, burning, rashes or lesions   Lymph Nodes: No enlarged glands  Endocrine: No heat or cold intolerance; No hair loss  Musculoskeletal: No joint pain or swelling; No muscle, back or extremity pain  Psychiatric: No depression, anxiety, mood swings or difficulty sleeping  Heme/Lymph: No easy bruising or bleeding gums  Allergy and Immunologic: No hives or eczema    Vital Signs Last 24 Hrs  T(C): 36.4 (05 Oct 2017 08:02), Max: 37.1 (05 Oct 2017 05:08)  T(F): 97.5 (05 Oct 2017 08:02), Max: 98.7 (05 Oct 2017 05:08)  HR: 69 (05 Oct 2017 08:02) (64 - 82)  BP: 118/78 (05 Oct 2017 08:02) (107/78 - 137/60)  BP(mean): --  RR: 16 (05 Oct 2017 08:02) (16 - 16)  SpO2: 97% (05 Oct 2017 08:02) (96% - 100%)    PHYSICAL EXAM:    General: Well developed; well nourished; in no acute distress  Head: Normocephalic; atraumatic  ENMT: No nasal discharge; airway clear  Neck: Supple; non tender; no masses    Respiratory: No wheezes, rales or rhonchi  Cardiovascular: Regular rate and rhythm. S1 and S2 Normal; No murmurs, gallops or rubs  Gastrointestinal: Soft non-tender non-distended; Normal bowel sounds; No hepatosplenomegaly  Genitourinary: No costovertebral angle tenderness    Extremities: Normal range of motion, No clubbing, cyanosis or edema  Vascular: Peripheral pulses palpable 2+ bilaterally  Skin: Warm and dry. No acute rash  Lymph Nodes: No acute cervical adenopathy  Psychiatric: Cooperative and appropriate      Neurological Exam:   On Neurological Examination:    Mental Status - Patient is alert, awake, oriented X3.     Follows commands well and able to answer questions appropriately.   Mood and affect  normal    Speech -   Fluent                                Cranial Nerves -  cranial nerves II-XII are intact  Motor Exam -   Right upper 4/5 upper, hand  3/5  Left upper 5/5  Right lower5/5  Left lower 5/5    With stimuli positive movement of all 4 extremities    Muscle tone - is normal all over. No asymmetry is seen.      Sensory    Bilateral intact to light touch    Gait -  normal        Radiology  MRI-c spine shows SONA enhancing lesion at C1 level  Assessment-    Plan

## 2017-10-05 NOTE — PROGRESS NOTE ADULT - ASSESSMENT
53 y/o M with no significant PMHx presents to ED c/o worsening numbness, tingling and weakness on R side x 3 days. Pt was seen here 3 days ago for tingling in R hand only and was discharged w/ steroid medication he has been compliant with. Pt now has numbness and tingling in his R arm, shoulder, side, and thigh as well w/ some weakness in his R hand - pt is unable to make a fist secondary to involvement of Ulnar and radial nerve.      For MS; Tried LP today at bedside after getting a consent. It was unsuccessful. Will need IR guided LP tomorrow.

## 2017-10-05 NOTE — PROGRESS NOTE ADULT - PROBLEM SELECTOR PLAN 1
patient has right side weakness, numbness, and paraesthesia  Possible Multiple Sclerosis  MRI/MRA of head is Normal  MRI spine: Abnormally enhancing lesion in the dorsal cord at C1 level  IR guided LP in AM  C/w Aspirin and statin  Pt will need Steroids from tomorrow for MS  Dr. Real on board patient has right side weakness, numbness, and paraesthesia  Hx of improvement with steroids  MRI/MRA of head is Normal  MRI spine: Abnormally enhancing lesion in the dorsal cord at C1 level and concerns for demyelination   IR guided LP in AM  C/w Aspirin and statin  Pt will need Steroids from tomorrow for MS  Dr. Real on board

## 2017-10-05 NOTE — PROGRESS NOTE ADULT - PROBLEM SELECTOR PLAN 2
VTE score1   no dvt or GI prophylaxis needed Elevated Lyme IgG and IgM titers  Will need spinal tap for titers  Consulted Dr. Perkins

## 2017-10-05 NOTE — PROGRESS NOTE ADULT - PROBLEM SELECTOR PLAN 1
Needs LP- CSF study for Lyme titer and for r/o MS superimposed.  Needs IV solumedrol 1gm(in am and the IV should run for 4 hours) daily for three days then tapering dose of oral Prednisone for a week to d/c  Needs antibiotics for Lyme. follow the lyme titer for repeat antibiotics from CSF and make sure its become negative.  If his CSF is also positive for MS, he may need to be evaluated by MS expert in future.

## 2017-10-05 NOTE — PROGRESS NOTE ADULT - SUBJECTIVE AND OBJECTIVE BOX
ROONEYPATTI CROOK  MRN-[id number]    Patient is a 52y old  Male who presents with a chief complaint of numbness, tingling and paraesthesia of the right side of the body (04 Oct 2017 07:11)      HISTORY OF PRESENT ILLNESS:    MEDICATIONS  (STANDING):  aspirin  chewable 81 milliGRAM(s) Oral daily  atorvastatin 40 milliGRAM(s) Oral at bedtime      MEDICATIONS  (PRN):  acetaminophen   Tablet. 650 milliGRAM(s) Oral every 6 hours PRN Moderate Pain (4 - 6)      Allergies    No Known Allergies    Intolerances        PAST MEDICAL & SURGICAL HISTORY:  No pertinent past medical history  No significant past surgical history      FAMILY HISTORY:      SOCIAL HISTORY  Smoking History:     REVIEW OF SYSTEMS:    CONSTITUTIONAL:  Fevers [  ]  Yes  [x  ]  No                                   chills [  ]  Yes  [x  ]  No                               sweats  [  ]  Yes  [x  ]  No                                fatigue [  ]  Yes  [x  ]  No    HEENT:  Eyes:  Diplopia or blurred vision [  ]  Yes  [  ]  No    ENT:                        earache  [  ]  Yes  [x  ]  No                             sore throat  [  ]  Yes  [ x ]  No                            runny nose.  [  ]  Yes  [ x ]  No    CARDIOVASCULAR:       chest pain  [  ]  Yes  [ x ]  No                        squeezing, tightness,  [  ]  Yes  [x  ]  No                                      palpitations.  [  ]  Yes  [ x ]  No    RESPIRATORY:             Cough [  ]  Yes  [ x ]  No                                  Wheezing [  ]  Yes  [ x ]  No                                Hemoptysis [  ]  Yes  [  x]  No                                      Sputum [  ]  Yes  [ x ]  No                   Shortness of Breathe [  ]  Yes  [ x ]  No    GASTROINTESTINAL:      Abdominal pain  [  ]  Yes  [ x ]  No                                                    Nausea  [  ]  Yes  [x  ]  No                                                   Vomiting [  ]  Yes  [ x ]  No                                              Constipation [  ]  Yes  [ x ]  No                                                    Diarrhea [  ]  Yes  [ x ]  No    GENITOURINARY:           Incontinence [  ]  Yes  [  ]x  No                                                Dysuria [  ]  Yes  [ x ]  No                                      Blood in Urine  [  ]  Yes  [  x]  No                          Frequency or urgency.  [  ]  Yes  [ x ]  No    NEUROLOGIC:                     Paresthesias  [x  ]  Yes  [  ]  No, rt side of body                                             fasciculations  [  ]  Yes  [ x ]  No                                 weakness.  [- no                                                    Headache [  ]  Yes  [  ]  No                                  Loss of Conciousness [  ]  Yes  [  ]  No                                                     Insomnia [  ]  Yes  [  ]  No    PSYCHIATRIC:    Disorder of thought or mood.  [  ]  Yes  [  ]  No                                                           Anxiety [  ]  Yes  [  ]  No                                                      Depression [  ]  Yes  [  ]  No                                                         Dementia [  ]  Yes  [  ]  No    Vital Signs Last 24 Hrs  T(C): 36.4 (05 Oct 2017 08:02), Max: 37.1 (05 Oct 2017 05:08)  T(F): 97.5 (05 Oct 2017 08:02), Max: 98.7 (05 Oct 2017 05:08)  HR: 69 (05 Oct 2017 08:02) (64 - 82)  BP: 118/78 (05 Oct 2017 08:02) (107/78 - 137/60)  BP(mean): --  RR: 16 (05 Oct 2017 08:02) (16 - 16)  SpO2: 97% (05 Oct 2017 08:02) (96% - 100%)  I&O's Detail    04 Oct 2017 07:01  -  05 Oct 2017 07:00  --------------------------------------------------------  IN:  Total IN: 0 mL    OUT:    Voided: 200 mL  Total OUT: 200 mL    Total NET: -200 mL          PHYSICAL EXAMINATION:    GENERAL: The patient is a well-developed, well-nourished _____in no apparent distress.     HEENT: Head is normocephalic and atraumatic. Extraocular muscles are intact. Mucous membranes are moist.     NECK: Supple. jvd [  ]  Yes  [x  ]  No    LUNGS: Clear to auscultation  [x  ]  Yes  [  ]  No                               wheezing, [  ]  Yes  [ x ]  No                                      rales  [  ]  Yes  [ x ]  No                                    rhonchi  [  ]  Yes  [ x ]  No                 Respirations labored  [  ]  Yes  [ x ]  No    HEART: Regular rate and rhythm  [  x]  Yes  [  ]  No                                        Murmur [  ]  Yes  [x  ]  No                                              rub  [  ]  Yes  [ x ]  No                                          gallop  [  ]  Yes  [ x ]  No    ABDOMEN:                                 Soft, [x  ]  Yes  [  ]  No                                             nontender [ x ]  Yes  [  ]  No                                             distended.[ x ]  Yes  [  ]  No                            hepatosplenomegaly ,[ x ]  Yes  [  ]  No                                                    BS   x[  ]  Yes  [  ]  No    EXTREMITIES:                cyanosis, [  ]  Yes  [  x]  No                                       clubbing,   [  ]  Yes  [x  ]  No                                               rash, [  ]  Yes  [ x ]  No                                           edema.  [  ]  Yes  [x  ]  No    NEUROLOGIC: Alert and Oriented  [ x ] Person [x  ] Time  [x ] Place                                    Cranial nerves intact    [x  ]  Yes  [  ]  No                                               sensory Intact  [ x ]  Yes  [  ]  No                                                  motor WNL   [  x]  Yes  [  ]  No                                                Ariel Paresis  [  ]  Yes  [  x]  No  DTR  babinski+ or -      LABS:                        15.4   7.2   )-----------( 212      ( 05 Oct 2017 07:51 )             48.0     10-05    137  |  101  |  13  ----------------------------<  102<H>  3.7   |  31  |  0.78    Ca    8.9      05 Oct 2017 07:51  Phos  3.3     10-05  Mg     1.9     10-05    TPro  7.7  /  Alb  4.3  /  TBili  0.3  /  DBili  x   /  AST  29  /  ALT  40  /  AlkPhos  63  10-03    PT/INR - ( 03 Oct 2017 17:32 )   PT: 10.8 sec;   INR: 0.99 ratio         PTT - ( 03 Oct 2017 17:32 )  PTT:28.7 sec      CARDIAC MARKERS ( 04 Oct 2017 00:15 )  <0.015 ng/mL / x     / 44 U/L / x     / 1.2 ng/mL  CARDIAC MARKERS ( 03 Oct 2017 17:32 )  <0.015 ng/mL / x     / 52 U/L / x     / x                    MICROBIOLOGY:    RADIOLOGY & ADDITIONAL STUDIES:< from: MRI Cervical Spine w/Cont (10.05.17 @ 10:54) >    IMPRESSION:  Abnormally enhancing lesion in the dorsal cord at C1 level. Recommend   clinical correlation for demyelinating disease.        < end of copied text >      CXR:    Ct scan chest:    ekg;    echo:

## 2017-10-06 LAB
ANION GAP SERPL CALC-SCNC: 6 MMOL/L — SIGNIFICANT CHANGE UP (ref 5–17)
BUN SERPL-MCNC: 13 MG/DL — SIGNIFICANT CHANGE UP (ref 7–18)
CALCIUM SERPL-MCNC: 8.7 MG/DL — SIGNIFICANT CHANGE UP (ref 8.4–10.5)
CHLORIDE SERPL-SCNC: 106 MMOL/L — SIGNIFICANT CHANGE UP (ref 96–108)
CO2 SERPL-SCNC: 28 MMOL/L — SIGNIFICANT CHANGE UP (ref 22–31)
CREAT SERPL-MCNC: 0.57 MG/DL — SIGNIFICANT CHANGE UP (ref 0.5–1.3)
GLUCOSE SERPL-MCNC: 104 MG/DL — HIGH (ref 70–99)
HCT VFR BLD CALC: 45.2 % — SIGNIFICANT CHANGE UP (ref 39–50)
HGB BLD-MCNC: 15.2 G/DL — SIGNIFICANT CHANGE UP (ref 13–17)
MAGNESIUM SERPL-MCNC: 2.2 MG/DL — SIGNIFICANT CHANGE UP (ref 1.6–2.6)
MCHC RBC-ENTMCNC: 32.4 PG — SIGNIFICANT CHANGE UP (ref 27–34)
MCHC RBC-ENTMCNC: 33.5 GM/DL — SIGNIFICANT CHANGE UP (ref 32–36)
MCV RBC AUTO: 96.8 FL — SIGNIFICANT CHANGE UP (ref 80–100)
PHOSPHATE SERPL-MCNC: 3.9 MG/DL — SIGNIFICANT CHANGE UP (ref 2.5–4.5)
PLATELET # BLD AUTO: 194 K/UL — SIGNIFICANT CHANGE UP (ref 150–400)
POTASSIUM SERPL-MCNC: 4.1 MMOL/L — SIGNIFICANT CHANGE UP (ref 3.5–5.3)
POTASSIUM SERPL-SCNC: 4.1 MMOL/L — SIGNIFICANT CHANGE UP (ref 3.5–5.3)
RBC # BLD: 4.67 M/UL — SIGNIFICANT CHANGE UP (ref 4.2–5.8)
RBC # FLD: 12.1 % — SIGNIFICANT CHANGE UP (ref 10.3–14.5)
SODIUM SERPL-SCNC: 140 MMOL/L — SIGNIFICANT CHANGE UP (ref 135–145)
WBC # BLD: 8.1 K/UL — SIGNIFICANT CHANGE UP (ref 3.8–10.5)
WBC # FLD AUTO: 8.1 K/UL — SIGNIFICANT CHANGE UP (ref 3.8–10.5)

## 2017-10-06 PROCEDURE — 62270 DX LMBR SPI PNXR: CPT

## 2017-10-06 PROCEDURE — 99233 SBSQ HOSP IP/OBS HIGH 50: CPT

## 2017-10-06 RX ADMIN — Medication 81 MILLIGRAM(S): at 12:22

## 2017-10-06 RX ADMIN — Medication 650 MILLIGRAM(S): at 10:51

## 2017-10-06 RX ADMIN — Medication 650 MILLIGRAM(S): at 22:35

## 2017-10-06 RX ADMIN — Medication 650 MILLIGRAM(S): at 22:09

## 2017-10-06 RX ADMIN — ATORVASTATIN CALCIUM 40 MILLIGRAM(S): 80 TABLET, FILM COATED ORAL at 22:09

## 2017-10-06 RX ADMIN — Medication 650 MILLIGRAM(S): at 12:25

## 2017-10-06 NOTE — PROGRESS NOTE ADULT - SUBJECTIVE AND OBJECTIVE BOX
PGY 1 Note discussed with supervising resident and primary attending    Patient is a 52y old  Male who presents with a chief complaint of numbness, tingling and paraesthesia of the right side of the body (04 Oct 2017 07:11)      INTERVAL HPI/OVERNIGHT EVENTS: offers no new complaints; Pt is still having Rt sided weakness and paresthesia.     MEDICATIONS  (STANDING):  aspirin  chewable 81 milliGRAM(s) Oral daily  atorvastatin 40 milliGRAM(s) Oral at bedtime    MEDICATIONS  (PRN):  acetaminophen   Tablet. 650 milliGRAM(s) Oral every 6 hours PRN Moderate Pain (4 - 6)      __________________________________________________  REVIEW OF SYSTEMS:    CONSTITUTIONAL: No fever,   EYES: no acute visual disturbances  NECK: No pain or stiffness  RESPIRATORY: No cough; No shortness of breath  CARDIOVASCULAR: No chest pain, no palpitations  GASTROINTESTINAL: No pain. No nausea or vomiting; No diarrhea   NEUROLOGICAL: No headache or numbness, no tremors  MUSCULOSKELETAL: Diffuse weakness in Rt arm and numbness in Rt LE  GENITOURINARY: no dysuria, no frequency, no hesitancy  PSYCHIATRY: no depression , no anxiety  ALL OTHER  ROS negative         Vital Signs Last 24 Hrs  T(C): 36.5 (06 Oct 2017 11:24), Max: 36.7 (05 Oct 2017 23:34)  T(F): 97.7 (06 Oct 2017 11:24), Max: 98.1 (05 Oct 2017 23:34)  HR: 72 (06 Oct 2017 11:24) (70 - 86)  BP: 108/72 (06 Oct 2017 11:24) (108/72 - 130/68)  BP(mean): --  RR: 17 (06 Oct 2017 11:24) (16 - 17)  SpO2: 98% (06 Oct 2017 11:24) (96% - 100%)    ________________________________________________  PHYSICAL EXAM:    GENERAL: NAD  HEENT: Normocephalic;  conjunctivae and sclerae clear; moist mucous membranes;   NECK : supple  CHEST/LUNG: Clear to auscultation bilaterally with good air entry   HEART: S1 S2  regular; no murmurs, gallops or rubs  ABDOMEN: Soft, Nontender, Nondistended; Bowel sounds present  EXTREMITIES: Numbness and weakness in Rt body with Wrist drop plus ulcer nerve involvement.   SKIN: warm and dry; no rash  NERVOUS SYSTEM:  Awake and alert; Oriented  to place, person and time ; no new     _________________________________________________  LABS:                        15.2   8.1   )-----------( 194      ( 06 Oct 2017 07:59 )             45.2     10-06    140  |  106  |  13  ----------------------------<  104<H>  4.1   |  28  |  0.57    Ca    8.7      06 Oct 2017 07:59  Phos  3.9     10-06  Mg     2.2     10-06      Plan of care was discussed with patient and /or primary care giver; all questions and concerns were addressed and care was aligned with patient's wishes.

## 2017-10-06 NOTE — CONSULT NOTE ADULT - ASSESSMENT
52 year old male from home medical history of ? multiple sclerosis, presented with right arm, right lower extremity weakness, numbness and tingling since 3 days found to have positive Lymes antibodies   1. R/O Lymes disease   - not a typical presentation though possible exposure to tick bite in history present hence f/u western blot to r/o Lymes disease   - f/u blood cultures   - no antibiotics; no leucocytosis, fever no targetoid lesion 52 year old male from home medical history of ? multiple sclerosis, presented with right arm, right lower extremity weakness, numbness and tingling since 3 days found to have positive Lymes antibodies (screening test but westernblot is neg)  1.  Lymes disease  ruled out  - no antibiotics needed  reconsult prn

## 2017-10-06 NOTE — PROGRESS NOTE ADULT - SUBJECTIVE AND OBJECTIVE BOX
Follow up visit  Pt is seen and examined. he has been seen by ID for +ve Lyme IgG/IgM and they also suggested that pt should have LP-CSF examination. Over all pt has been stable.      Allergies    No Known Allergies      MEDICATIONS  (STANDING):  aspirin  chewable 81 milliGRAM(s) Oral daily  atorvastatin 40 milliGRAM(s) Oral at bedtime    MEDICATIONS  (STANDING):  aspirin  chewable 81 milliGRAM(s) Oral daily  atorvastatin 40 milliGRAM(s) Oral at bedtime    MEDICATIONS  (PRN):  acetaminophen   Tablet. 650 milliGRAM(s) Oral every 6 hours PRN Moderate Pain (4 - 6)    acetaminophen   Tablet. 650 milliGRAM(s) Oral every 6 hours PRN  aspirin  chewable 81 milliGRAM(s) Oral daily  atorvastatin 40 milliGRAM(s) Oral at bedtime      Review of Systems  General: Not present - chills, fatigue, fevers  Skin: no rash present  HEENT: Not present- head injury, blurred vision, double vision, eye pain, visual loss, hearing loss, deafness, ear pain, ringing in the ears, vertigo, sinus pain, and voice changes  Neck: Not present - neck stiffness  Respiratory: Not present - cough and difficulty breathing  Cardiovascular: Not present - calf cramps, chest pain, leg pain and/or swelling, rapid heart rate and shortness of breath  Gastrointestinal: Not present - abdominal pain, nausea, vomiting  Musculoskeletal: Not present - back pain, joint pain, joint stiffness, leg cramps, muscle atrophy, muscle cramps, muscle weakness, and swelling of extremities  Neurological: Not present - Dizziness, decreased memory, fainting, +ve right arm/hand weakness, headaches, incontinence of stool, incontinence of urine, loss of consciousness, numbness, seizures, spinning sensation, stroke, trouble walking, unsteadiness, visual changes and weakness  Psychiatric: Not present - depression, anxiety, hallucinations, inability to conscentrate, mood changes and panic attacks  Hematology: Not present - blood clots and spontaneous bleeding    Vital Signs  Vital Signs Last 24 Hrs  T(C): 36.5 (06 Oct 2017 11:24), Max: 36.7 (05 Oct 2017 23:34)  T(F): 97.7 (06 Oct 2017 11:24), Max: 98.1 (05 Oct 2017 23:34)  HR: 72 (06 Oct 2017 11:24) (70 - 86)  BP: 108/72 (06 Oct 2017 11:24) (108/72 - 130/68)  BP(mean): --  RR: 17 (06 Oct 2017 11:24) (16 - 17)  SpO2: 98% (06 Oct 2017 11:24) (96% - 100%)    Medications  acetaminophen   Tablet. 650 milliGRAM(s) Oral every 6 hours PRN  aspirin  chewable 81 milliGRAM(s) Oral daily  atorvastatin 40 milliGRAM(s) Oral at bedtime        Neurological Exam:    Mental Status -  alert  Fund of Knowledge – normal  Affect- appropriate  Recent Memory – normal  Remote Memory – normal  Attention Span – normal  Concentration –  normal  Cognitive function – normal  Speech – normal  Thought content/perception – normal    Cranial Nerves:  II Optic: Visual acuity – Bilateral - Normal ; Visual fields – normal ; Fundi – Bilateral – no optic atrophy or pailledema  III Oculomotor – normal bilaterally  IV Trochlear – Bilateral – normal  V Trigeminal: Ophthalmic – Bilateral – normal  ;   Maxillary – Bilateral- normal ;  Mandibular – Bilateral - normal  VI Abducens – Bilateral - normal  VII Facial: Normal bilaterally  VIII Acoustic - Bilateral – hearing normal and (hearing tested by finger rub)  IX Glossopharyngeal / X Vagus: Uvula – normal  XI Accessory: normal shoulder shrug  XII Hypoglossal – bilaterally normal  Eye Movements: Gaze – Bilateral - normal    Nystagmus – Bilateral – none  Motor:  Bulk and Contour: normal  Tone: normal  Strength: 5/5 normal muscle strength – all muscles except right hand  weakness and weakness at right elbow  General Assessment of Reflexes: Right Wrist - 2+  ;  Left Wrist - 2+ ; Right Elbow - 2+ ;  Left Elbow - 2+ ;  Right Knee - 2+ ;  Left Knee - 2+ ;   Right Ankle – 2+ ; Left Ankle – 2+  Plantar Reflexes (L4-S2) – Bilateral – Flexion  Sensory:  Light Touch: Intact – globally  Pain: Intact – globally  Temperature: Intact – globally  Coordination – no impariment of heel-to-shin, impairment of finger-to-nose or impairment of rapid alternating movement  Gait – Normal tandem walking, normal heel walking and normal toes walking  Romberg’s sign: - Normal

## 2017-10-06 NOTE — PROGRESS NOTE ADULT - PROBLEM SELECTOR PLAN 1
patient has right side weakness, numbness, and paraesthesia  Hx of improvement with steroids  MRI/MRA of head is Normal  MRI spine: Abnormally enhancing lesion in the dorsal cord at C1 level and concerns for demyelination   IR guided LP was unsuccessful   C/w Aspirin and statin  Needs IV solumedrol 1gm (in am and the IV should run for 4 hours) daily for three days then tapering dose of oral Prednisone for a week to d/c  Dr. Real on board

## 2017-10-06 NOTE — PROGRESS NOTE ADULT - SUBJECTIVE AND OBJECTIVE BOX
PATTI ROONEY  MRN-[id number]    Patient is a 52y old  Male who presents with a chief complaint of numbness, tingling and paraesthesia of the right side of the body (04 Oct 2017 07:11)      HISTORY OF PRESENT ILLNESS:  .  .MEDICATIONS  (STANDING):  aspirin  chewable 81 milliGRAM(s) Oral daily  atorvastatin 40 milliGRAM(s) Oral at bedtime    MEDICATIONS  (PRN):  acetaminophen   Tablet. 650 milliGRAM(s) Oral every 6 hours PRN Moderate Pain (4 - 6)    Allergies    No Known Allergies    Intolerances        PAST MEDICAL & SURGICAL HISTORY:  No pertinent past medical history  No significant past surgical history      FAMILY HISTORY:      SOCIAL HISTORY  Smoking History:     REVIEW OF SYSTEMS:    CONSTITUTIONAL:  Fevers [  ]  Yes  [x  ]  No                                   chills [  ]  Yes  [x  ]  No                               sweats  [  ]  Yes  [x  ]  No                                fatigue [  ]  Yes  [x  ]  No    HEENT:  Eyes:  Diplopia or blurred vision [  ]  Yes  [  ]  No    ENT:                        earache  [  ]  Yes  [x  ]  No                             sore throat  [  ]  Yes  [ x ]  No                            runny nose.  [  ]  Yes  [ x ]  No    CARDIOVASCULAR:       chest pain  [  ]  Yes  [ x ]  No                        squeezing, tightness,  [  ]  Yes  [x  ]  No                                      palpitations.  [  ]  Yes  [ x ]  No    RESPIRATORY:             Cough [  ]  Yes  [ x ]  No                                  Wheezing [  ]  Yes  [ x ]  No                                Hemoptysis [  ]  Yes  [  x]  No                                      Sputum [  ]  Yes  [ x ]  No                   Shortness of Breathe [  ]  Yes  [ x ]  No    GASTROINTESTINAL:      Abdominal pain  [  ]  Yes  [ x ]  No                                                    Nausea  [  ]  Yes  [x  ]  No                                                   Vomiting [  ]  Yes  [ x ]  No                                              Constipation [  ]  Yes  [ x ]  No                                                    Diarrhea [  ]  Yes  [ x ]  No    GENITOURINARY:           Incontinence [  ]  Yes  [  ]x  No                                                Dysuria [  ]  Yes  [ x ]  No                                      Blood in Urine  [  ]  Yes  [  x]  No                          Frequency or urgency.  [  ]  Yes  [ x ]  No    NEUROLOGIC:                     Paresthesias  [x  ]  Yes  [  ]  No, rt side of body                                             fasciculations  [  ]  Yes  [ x ]  No                                 weakness.  [- no                                                    Headache [  ]  Yes  [  ]  No                                  Loss of Conciousness [  ]  Yes  [  ]  No                                                     Insomnia [  ]  Yes  [  ]  No    PSYCHIATRIC:    Disorder of thought or mood.  [  ]  Yes  [  ]  No                                                           Anxiety [  ]  Yes  [  ]  No                                                      Depression [  ]  Yes  [  ]  No                                                         Dementia [  ]  Yes  [  ]  No      Vital Signs Last 24 Hrs  T(C): 36.7 (06 Oct 2017 08:33), Max: 36.7 (05 Oct 2017 12:05)  T(F): 98 (06 Oct 2017 08:33), Max: 98.1 (05 Oct 2017 12:05)  HR: 70 (06 Oct 2017 08:33) (70 - 86)  BP: 117/78 (06 Oct 2017 08:33) (110/75 - 130/68)  BP(mean): --  RR: 17 (06 Oct 2017 08:33) (16 - 17)  SpO2: 96% (06 Oct 2017 08:33) (96% - 100%)    .GENERAL: The patient is a well-developed, well-nourished _____in no apparent distress.     HEENT: Head is normocephalic and atraumatic. Extraocular muscles are intact. Mucous membranes are moist.     NECK: Supple. jvd [  ]  Yes  [x  ]  No    LUNGS: Clear to auscultation  [x  ]  Yes  [  ]  No                               wheezing, [  ]  Yes  [ x ]  No                                      rales  [  ]  Yes  [ x ]  No                                    rhonchi  [  ]  Yes  [ x ]  No                 Respirations labored  [  ]  Yes  [ x ]  No    HEART: Regular rate and rhythm  [  x]  Yes  [  ]  No                                        Murmur [  ]  Yes  [x  ]  No                                              rub  [  ]  Yes  [ x ]  No                                          gallop  [  ]  Yes  [ x ]  No    ABDOMEN:                                 Soft, [x  ]  Yes  [  ]  No                                             nontender [ x ]  Yes  [  ]  No                                             distended.[ x ]  Yes  [  ]  No                            hepatosplenomegaly ,[ x ]  Yes  [  ]  No                                                    BS   x[  ]  Yes  [  ]  No    EXTREMITIES:                cyanosis, [  ]  Yes  [  x]  No                                       clubbing,   [  ]  Yes  [x  ]  No                                               rash, [  ]  Yes  [ x ]  No                                           edema.  [  ]  Yes  [x  ]  No    NEUROLOGIC: Alert and Oriented  [ x ] Person [x  ] Time  [x ] Place                                    Cranial nerves intact    [x  ]  Yes  [  ]  No                                               sensory Intact  [ x ]  Yes  [  ]  No                                                  motor WNL   [  x]  Yes  [  ]  No                                                Ariel Paresis  [  ]  Yes  [  x]  No  DTR  babinski+ or -      LABS:                        15.4   7.2   )-----------( 212      ( 05 Oct 2017 07:51 )             48.0     10-05    137  |  101  |  13  ----------------------------<  102<H>  3.7   |  31  |  0.78    Ca    8.9      05 Oct 2017 07:51  Phos  3.3     10-05  Mg     1.9     10-05    TPro  7.7  /  Alb  4.3  /  TBili  0.3  /  DBili  x   /  AST  29  /  ALT  40  /  AlkPhos  63  10-03    PT/INR - ( 03 Oct 2017 17:32 )   PT: 10.8 sec;   INR: 0.99 ratio         PTT - ( 03 Oct 2017 17:32 )  PTT:28.7 sec      CARDIAC MARKERS ( 04 Oct 2017 00:15 )  <0.015 ng/mL / x     / 44 U/L / x     / 1.2 ng/mL  CARDIAC MARKERS ( 03 Oct 2017 17:32 )  <0.015 ng/mL / x     / 52 U/L / x     / x                    MICROBIOLOGY:    RADIOLOGY & ADDITIONAL STUDIES:< from: MRI Cervical Spine w/Cont (10.05.17 @ 10:54) >    IMPRESSION:  Abnormally enhancing lesion in the dorsal cord at C1 level. Recommend   clinical correlation for demyelinating disease.        < end of copied text >      CXR:    Ct scan chest:    ekg;    echo:

## 2017-10-06 NOTE — CONSULT NOTE ADULT - SUBJECTIVE AND OBJECTIVE BOX
52 year old male from home medical history of ? multiple sclerosis, presented with right arm, right lower extremity weakness, numbness and tingling since 3 days/ Reports similar complaints 5 years ago that resolved with iv steroids for 3 days.  Screening for Lyme's disease was positive hence ID was called for further evaluation.   Patient was seen comfortable in bed. Report dull right sided headache, persistent paresthesias of the right limbs and 'right side of the body feeling weird'. He has a friend in [place] and seen him on weekends. He is not sure if he had a tick bite in the backyard where they used to hang out. Otherwise denies cough, nasal congestion, lesion/rash, arthritis/joint pain, chest pain, abdominal pain, nausea/ vomiting diarrhea or urinary complaints.         PMH/PSH: No pertinent past medical history  No pertinent past medical history  No significant past surgical history  No significant past surgical history  ,   Allergy: No Known Allergies  ,     Meds at home:  Meds in hospital:  acetaminophen   Tablet. 650 milliGRAM(s) Oral every 6 hours PRN  aspirin  chewable 81 milliGRAM(s) Oral daily  atorvastatin 40 milliGRAM(s) Oral at bedtime      VITALS:  T(F): 98 (10-06-17 @ 08:33), Max: 98.1 (10-05-17 @ 12:05)  HR: 70 (10-06-17 @ 08:33)  BP: 117/78 (10-06-17 @ 08:33)  RR: 17 (10-06-17 @ 08:33)  SpO2: 96% (10-06-17 @ 08:33)  Wt(kg): --    10-05 @ 07:01  -  10-06 @ 07:00  --------------------------------------------------------  IN: 200 mL / OUT: 0 mL / NET: 200 mL          LABS:                        15.2   8.1   )-----------( 194      ( 06 Oct 2017 07:59 )             45.2     10-06    140  |  106  |  13  ----------------------------<  104<H>  4.1   |  28  |  0.57    Ca    8.7      06 Oct 2017 07:59  Phos  3.9     10-06  Mg     2.2     10-06          CAPILLARY BLOOD GLUCOSE          REVIEW OF SYSTEMS:  CONSTITUTIONAL:  fatigue  EYES: No eye pain, visual disturbances, or discharge  ENMT:  No difficulty hearing,  No sinus or throat pain  NECK: No pain or stiffness  RESPIRATORY: No cough, wheezing, chills or hemoptysis; No shortness of breath  CARDIOVASCULAR: No chest pain, palpitations, or leg swelling  GASTROINTESTINAL: No abdominal or epigastric pain. No nausea, vomiting, or hematemesis; No diarrhea or constipation.   GENITOURINARY: No dysuria, frequency, hematuria,   NEUROLOGICAL: right sided headache, loss of strength, numbness and tingling in the right extremities   SKIN: No itching, burning, rashes, or lesions       RADIOLOGY & ADDITIONAL TESTS:    1. MRI 10/5 : Thereis abnormal enhancement in the dorsal cervical cord at C1 level.   The additional lesions at C4-C5 and C6 seen on noncontrast MRI   T2-weighted images do not demonstrate abnormal enhancement.    Cervical alignment is maintained. No spondylolisthesis.Mild chronic loss   of C5 and C6 vertebral body height. Remaining vertebral body heights are   preserved. Mild degenerative Modic endplate signal changes at C5-C6 and   C6-C7. No focus of abnormal enhancement in the vertebral bodies.    IMPRESSION:  Abnormally enhancing lesion in the dorsal cord at C1 level. Recommend   clinical correlation for demyelinating disease.      PHYSICAL EXAM:  GENERAL: Comfortable in bed. NAD, well-groomed, well-developed  HEAD:  Atraumatic, Normocephalic  EYES: PERRL, conjunctiva and sclera clear  ENMT: No tonsillar erythema, exudates, or enlargement; Moist mucous membranes, Good dentition, No lesions  NECK: Supple, No JVD, Normal thyroid  NERVOUS SYSTEM:  Alert & Oriented X3, Motor Strength 5/5 left upper and lower extremities; 4/5 RUL anf RLL.   CHEST/LUNG: Clear to percussion bilaterally; No rales, rhonchi, wheezing, or rubs  HEART: Regular rate and rhythm; No murmur  ABDOMEN: Soft, Nontender, Nondistended; Bowel sounds present  EXTREMITIES:  2+ Peripheral Pulses, No clubbing, cyanosis, or edema  LYMPH: No lymphadenopathy noted  SKIN: No targetoid rashes present. Some melanotic blemishes present on the back.     Care Discussed with Consultants/Other Providers [ ] YES  [ ] NO 52 year old male from home medical history of ? multiple sclerosis, presented with right arm, right lower extremity weakness, numbness and tingling since 3 days/ Reports similar complaints 5 years ago that resolved with iv steroids for 3 days.  Screening for Lyme's disease was positive hence ID was called for further evaluation.   Patient was seen comfortable in bed. Report dull right sided headache, persistent paresthesias of the right limbs and 'right side of the body feeling weird'. He has a friend in [place] and seen him on weekends. He is not sure if he had a tick bite in the backyard where they used to hang out. Otherwise denies cough, nasal congestion, lesion/rash, arthritis/joint pain, chest pain, abdominal pain, nausea/ vomiting diarrhea or urinary complaints.         PMH/PSH: No pertinent past medical history  No pertinent past medical history  No significant past surgical history  No significant past surgical history  ,   Allergy: No Known Allergies  ,     Meds at home:  Meds in hospital:  acetaminophen   Tablet. 650 milliGRAM(s) Oral every 6 hours PRN  aspirin  chewable 81 milliGRAM(s) Oral daily  atorvastatin 40 milliGRAM(s) Oral at bedtime      VITALS:  T(F): 98 (10-06-17 @ 08:33), Max: 98.1 (10-05-17 @ 12:05)  HR: 70 (10-06-17 @ 08:33)  BP: 117/78 (10-06-17 @ 08:33)  RR: 17 (10-06-17 @ 08:33)  SpO2: 96% (10-06-17 @ 08:33)  Wt(kg): --    10-05 @ 07:01  -  10-06 @ 07:00  --------------------------------------------------------  IN: 200 mL / OUT: 0 mL / NET: 200 mL          LABS:                        15.2   8.1   )-----------( 194      ( 06 Oct 2017 07:59 )             45.2     10-06    140  |  106  |  13  ----------------------------<  104<H>  4.1   |  28  |  0.57    Ca    8.7      06 Oct 2017 07:59  Phos  3.9     10-  Mg     2.2     10-      Lyme AB,Western Blot Confirm (10.04.17 @ 09:41)    Lyme AB Western Blot 18KD IgG: Absent    Lyme AB Western Blot 18KD IgM: Absent    Lyme AB Western Blot 23KD IgG: Absent    Lyme AB Western Blot 23KD IgM: Absent    Lyme AB Western Blot 28KD IgG: Absent    Lyme AB Western Blot 28KD IgM: Absent    Lyme AB Western Blot 30KD IgG: Absent    Lyme AB Western Blot 30KD IgM: Absent    Lyme AB Western Blot 31KD IgG: Absent    Lyme AB Western Blot 31KD IgM: Absent    Lyme AB Western Blot 39KD IgG: Absent    Lyme AB Western Blot 39KD IgM: Absent    Lyme AB Western Blot 41KD IgG: Absent    Lyme AB Western Blot 41KD IgM: Absent    Lyme AB Western Blot 45KD IgG: Absent    Lyme AB Western Blot 45KD IgM: Absent    Lyme AB Western Blot 58KD IgG: Absent    Lyme AB Western Blot 58KD IgM: Absent    Lyme AB Western Blot 66KD IgG: Absent    Lyme AB Western Blot 66KD IgM: Absent    Lyme AB Western Blot 93KD IgG: Absent    Lyme AB Western Blot 93KD IgM: Absent    Lyme WB IgM Interpretation: Negative    Lyme WB IgG Interpretation: Negative: A negative result should not be used as the sole basis for  excluding B.  burgdorferi as the cause of illness. Because the presence of even  specific antibodies to B. burgdorferi does not always indicate current  infection, a positive result can support, but not establish, a clinical  diagnosis of Lyme disease. A positive IgM anti-B. burgdorferi result  alone is not adequate for supporting a diagnosis of Lyme disease  in persons with illness of greater than one-month duration.  CDC interpretive criteria:  IgM: 2 of 3 following bands 41, 39, 23  Ig of 10 following bands 93, 66, 58, 45, 41, 39, 30, 28, 23, 18        CAPILLARY BLOOD GLUCOSE          REVIEW OF SYSTEMS:  CONSTITUTIONAL:  fatigue  EYES: No eye pain, visual disturbances, or discharge  ENMT:  No difficulty hearing,  No sinus or throat pain  NECK: No pain or stiffness  RESPIRATORY: No cough, wheezing, chills or hemoptysis; No shortness of breath  CARDIOVASCULAR: No chest pain, palpitations, or leg swelling  GASTROINTESTINAL: No abdominal or epigastric pain. No nausea, vomiting, or hematemesis; No diarrhea or constipation.   GENITOURINARY: No dysuria, frequency, hematuria,   NEUROLOGICAL: right sided headache, loss of strength, numbness and tingling in the right extremities   SKIN: No itching, burning, rashes, or lesions       RADIOLOGY & ADDITIONAL TESTS:    1. MRI 10/5 : Thereis abnormal enhancement in the dorsal cervical cord at C1 level.   The additional lesions at C4-C5 and C6 seen on noncontrast MRI   T2-weighted images do not demonstrate abnormal enhancement.    Cervical alignment is maintained. No spondylolisthesis.Mild chronic loss   of C5 and C6 vertebral body height. Remaining vertebral body heights are   preserved. Mild degenerative Modic endplate signal changes at C5-C6 and   C6-C7. No focus of abnormal enhancement in the vertebral bodies.    IMPRESSION:  Abnormally enhancing lesion in the dorsal cord at C1 level. Recommend   clinical correlation for demyelinating disease.      PHYSICAL EXAM:  GENERAL: Comfortable in bed. NAD, well-groomed, well-developed  HEAD:  Atraumatic, Normocephalic  EYES: PERRL, conjunctiva and sclera clear  ENMT: No tonsillar erythema, exudates, or enlargement; Moist mucous membranes, Good dentition, No lesions  NECK: Supple, No JVD, Normal thyroid  NERVOUS SYSTEM:  Alert & Oriented X3, Motor Strength 5/5 left upper and lower extremities; 4/5 RUL anf RLL.   CHEST/LUNG: Clear to percussion bilaterally; No rales, rhonchi, wheezing, or rubs  HEART: Regular rate and rhythm; No murmur  ABDOMEN: Soft, Nontender, Nondistended; Bowel sounds present  EXTREMITIES:  2+ Peripheral Pulses, No clubbing, cyanosis, or edema  LYMPH: No lymphadenopathy noted  SKIN: No target rashes present. Some melanotic blemishes present on the back.     Care Discussed with Consultants/Other Providers [ ] YES  [ ] NO

## 2017-10-06 NOTE — PROGRESS NOTE ADULT - ASSESSMENT
51 y/o M with no significant PMHx presents to ED c/o worsening numbness, tingling and weakness on R side x 3 days. Pt was seen here 3 days ago for tingling in R hand only and was discharged w/ steroid medication he has been compliant with. Pt now has numbness and tingling in his R arm, shoulder, side, and thigh as well w/ some weakness in his R hand - pt is unable to make a fist secondary to involvement of Ulnar and radial nerve.      For MS; Tried LP through IR today. It was unsuccessful as patient was feeling a lot of pain and became agitated.

## 2017-10-06 NOTE — PROGRESS NOTE ADULT - PROBLEM SELECTOR PLAN 2
Elevated Lyme IgG and IgM screen titers  Negative western blots  No need of abx  Consulted Dr. Perkins

## 2017-10-07 LAB
ANION GAP SERPL CALC-SCNC: 5 MMOL/L — SIGNIFICANT CHANGE UP (ref 5–17)
BUN SERPL-MCNC: 12 MG/DL — SIGNIFICANT CHANGE UP (ref 7–18)
CALCIUM SERPL-MCNC: 8.5 MG/DL — SIGNIFICANT CHANGE UP (ref 8.4–10.5)
CHLORIDE SERPL-SCNC: 103 MMOL/L — SIGNIFICANT CHANGE UP (ref 96–108)
CO2 SERPL-SCNC: 29 MMOL/L — SIGNIFICANT CHANGE UP (ref 22–31)
CREAT SERPL-MCNC: 0.71 MG/DL — SIGNIFICANT CHANGE UP (ref 0.5–1.3)
GLUCOSE SERPL-MCNC: 102 MG/DL — HIGH (ref 70–99)
HCT VFR BLD CALC: 43.7 % — SIGNIFICANT CHANGE UP (ref 39–50)
HGB BLD-MCNC: 14.4 G/DL — SIGNIFICANT CHANGE UP (ref 13–17)
MAGNESIUM SERPL-MCNC: 2 MG/DL — SIGNIFICANT CHANGE UP (ref 1.6–2.6)
MCHC RBC-ENTMCNC: 31.9 PG — SIGNIFICANT CHANGE UP (ref 27–34)
MCHC RBC-ENTMCNC: 33 GM/DL — SIGNIFICANT CHANGE UP (ref 32–36)
MCV RBC AUTO: 96.8 FL — SIGNIFICANT CHANGE UP (ref 80–100)
PHOSPHATE SERPL-MCNC: 3.4 MG/DL — SIGNIFICANT CHANGE UP (ref 2.5–4.5)
PLATELET # BLD AUTO: 188 K/UL — SIGNIFICANT CHANGE UP (ref 150–400)
POTASSIUM SERPL-MCNC: 4.1 MMOL/L — SIGNIFICANT CHANGE UP (ref 3.5–5.3)
POTASSIUM SERPL-SCNC: 4.1 MMOL/L — SIGNIFICANT CHANGE UP (ref 3.5–5.3)
RBC # BLD: 4.51 M/UL — SIGNIFICANT CHANGE UP (ref 4.2–5.8)
RBC # FLD: 12.2 % — SIGNIFICANT CHANGE UP (ref 10.3–14.5)
SODIUM SERPL-SCNC: 137 MMOL/L — SIGNIFICANT CHANGE UP (ref 135–145)
WBC # BLD: 7.2 K/UL — SIGNIFICANT CHANGE UP (ref 3.8–10.5)
WBC # FLD AUTO: 7.2 K/UL — SIGNIFICANT CHANGE UP (ref 3.8–10.5)

## 2017-10-07 PROCEDURE — 99233 SBSQ HOSP IP/OBS HIGH 50: CPT

## 2017-10-07 RX ORDER — PANTOPRAZOLE SODIUM 20 MG/1
40 TABLET, DELAYED RELEASE ORAL
Qty: 0 | Refills: 0 | Status: DISCONTINUED | OUTPATIENT
Start: 2017-10-07 | End: 2017-10-09

## 2017-10-07 RX ADMIN — Medication 64.5 MILLIGRAM(S): at 12:12

## 2017-10-07 RX ADMIN — Medication 81 MILLIGRAM(S): at 12:12

## 2017-10-07 RX ADMIN — ATORVASTATIN CALCIUM 40 MILLIGRAM(S): 80 TABLET, FILM COATED ORAL at 21:21

## 2017-10-07 RX ADMIN — Medication 650 MILLIGRAM(S): at 04:36

## 2017-10-07 NOTE — PROGRESS NOTE ADULT - SUBJECTIVE AND OBJECTIVE BOX
Follow Up:  Pt is seen and examined. pt went for LP under fluro and he declied. His Lyme western blot is negative. we decided to to treat his condition with IV solumedrol 1 gm run for 4 hours and we will give him for three days then he will be on oral Prednisone with tapering dose.     Allergies    No Known Allergies    Social history: same    MEDICATIONS  (STANDING):  aspirin  chewable 81 milliGRAM(s) Oral daily  atorvastatin 40 milliGRAM(s) Oral at bedtime  methylPREDNISolone sodium succinate IVPB 1000 milliGRAM(s) IV Intermittent once  pantoprazole    Tablet 40 milliGRAM(s) Oral before breakfast    Review of Systems  General: Not present - chills, fatigue, fevers  Skin: no rash present  HEENT: Not present- head injury, blurred viision, double vision, eye pain, visual loss, hearing loss, deafness, ear pain, ringing in the ears, vertigo, sinus pain, and voice changes  Neck: Not present - neck stiffness  Respiratory: Not present - cough and difficulty breathing  Cardiovascular: Not present - calf cramps, chest pain, leg pain and/or swelling, rapid heart rate and shortness of breath  Gastrointestinal: Not present - abdominal pain, nausea, vomiting  Musculoskeletal: Not present - back pain, joint pain, joint stiffness, leg cramps, muscle atrophy, muscle cramps, muscle weakness, and swelling of extremities  Neurological: Not present - Dizziness, decreased memory, fainting, focal neurological symptoms, headaches, incontinence of stool, incontinence of urine, loss of consciousness, numbness, seizures, spinning sensation, stroke, trouble walking, unsteadiness, visual changes and right forearm and hand  weakness  Psychiatric: Not present - depression, anxiety, hallucinations, inability to conscentrate, mood changes and panic attacks  Hematology: Not present - blood clots and spontaneous bleeding    Vital Signs  Vital Signs Last 24 Hrs  T(C): 36.5 (07 Oct 2017 11:39), Max: 36.8 (07 Oct 2017 07:56)  T(F): 97.7 (07 Oct 2017 11:39), Max: 98.2 (07 Oct 2017 07:56)  HR: 61 (07 Oct 2017 11:39) (58 - 73)  BP: 99/65 (07 Oct 2017 11:39) (99/65 - 118/79)  BP(mean): --  RR: 16 (07 Oct 2017 11:39) (16 - 17)  SpO2: 97% (07 Oct 2017 11:39) (97% - 100%)    Neurological Exam:    Mental Status -  alert  Fund of Knowledge – normal  Affect- appropriate  Recent Memory – normal  Remote Memory – normal  Attention Span – normal  Concentration –  normal  Cognitive function – normal  Speech – normal  Thought content/perception – normal    Cranial Nerves:  II Optic: Visual acuity – Bilateral - Normal ; Visual fields – normal ; Fundi – Bilateral – no optic atrophy or pailledema  III Oculomotor – normal bilaterally  IV Trochlear – Bilateral – normal  V Trigeminal: Ophthalmic – Bilateral – normal  ;   Maxillary – Bilateral- normal ;  Mandibular – Bilateral - normal  VI Abducens – Bilateral - normal  VII Facial: Normal bilaterally  VIII Acoustic - Bilateral – hearing normal and (hearing tested by finger rub)  IX Glossopharyngeal / X Vagus: Uvula – normal  XI Accessory: normal shoulder shrug  XII Hypoglossal – bilaterally normal  Eye Movements: Gaze – Bilateral - normal    Nystagmus – Bilateral – none  Motor:  Bulk and Contour: normal  Tone: normal  Strength: 5/5 normal muscle strength – except right hand  weakness and forearm  weakness  General Assessment of Reflexes: Right Wrist - 2+  ;  Left Wrist - 2+ ; Right Elbow - 2+ ;  Left Elbow - 2+ ;  Right Knee - 2+ ;  Left Knee - 2+ ;   Right Ankle – 2+ ; Left Ankle – 2+  Plantar Reflexes (L4-S2) – Bilateral – Flexion  Sensory:  Light Touch: Intact – globally  Pain: Intact – globally  Temperature: Intact – globally  Coordination – no impariment of heel-to-shin, impairment of finger-to-nose or impairment of rapid alternating movement  Gait – Normal tandem walking, normal heel walking and normal toes walking  Romberg’s sign: - Normal

## 2017-10-07 NOTE — PROGRESS NOTE ADULT - SUBJECTIVE AND OBJECTIVE BOX
PATTI ROONEY  MRN-[id number]    Patient is a 52y old  Male who presents with a chief complaint of numbness, tingling and paraesthesia of the right side of the body (04 Oct 2017 07:11)      HISTORY OF PRESENT ILLNESS:  s still has rt sided paresthesia     MEDICATIONS  (STANDING):  aspirin  chewable 81 milliGRAM(s) Oral daily  atorvastatin 40 milliGRAM(s) Oral at bedtime    MEDICATIONS  (PRN):  acetaminophen   Tablet. 650 milliGRAM(s) Oral every 6 hours PRN Moderate Pain (4 - 6)    .Allergies    No Known Allergies    Intolerances        PAST MEDICAL & SURGICAL HISTORY:  No pertinent past medical history  No significant past surgical history      FAMILY HISTORY:      SOCIAL HISTORY  Smoking History:     REVIEW OF SYSTEMS:    CONSTITUTIONAL:  Fevers [  ]  Yes  [x  ]  No                                   chills [  ]  Yes  [x  ]  No                               sweats  [  ]  Yes  [x  ]  No                                fatigue [  ]  Yes  [x  ]  No    HEENT:  Eyes:  Diplopia or blurred vision [  ]  Yes  [  ]  No    ENT:                        earache  [  ]  Yes  [x  ]  No                             sore throat  [  ]  Yes  [ x ]  No                            runny nose.  [  ]  Yes  [ x ]  No    CARDIOVASCULAR:       chest pain  [  ]  Yes  [ x ]  No                        squeezing, tightness,  [  ]  Yes  [x  ]  No                                      palpitations.  [  ]  Yes  [ x ]  No    RESPIRATORY:             Cough [  ]  Yes  [ x ]  No                                  Wheezing [  ]  Yes  [ x ]  No                                Hemoptysis [  ]  Yes  [  x]  No                                      Sputum [  ]  Yes  [ x ]  No                   Shortness of Breathe [  ]  Yes  [ x ]  No    GASTROINTESTINAL:      Abdominal pain  [  ]  Yes  [ x ]  No                                                    Nausea  [  ]  Yes  [x  ]  No                                                   Vomiting [  ]  Yes  [ x ]  No                                              Constipation [  ]  Yes  [ x ]  No                                                    Diarrhea [  ]  Yes  [ x ]  No    GENITOURINARY:           Incontinence [  ]  Yes  [  ]x  No                                                Dysuria [  ]  Yes  [ x ]  No                                      Blood in Urine  [  ]  Yes  [  x]  No                          Frequency or urgency.  [  ]  Yes  [ x ]  No    NEUROLOGIC:                     Paresthesias  [x  ]  Yes  [  ]  No, rt side of body                                             fasciculations  [  ]  Yes  [ x ]  No                                 weakness.  [- no                                                    Headache [  ]  Yes  [  ]  No                                  Loss of Conciousness [  ]  Yes  [  ]  No                                                     Insomnia [  ]  Yes  [  ]  No    PSYCHIATRIC:    Disorder of thought or mood.  [  ]  Yes  [  ]  No                                                           Anxiety [  ]  Yes  [  ]  No                                                      Depression [  ]  Yes  [  ]  No                                                         Dementia [  ]  Yes  [ x ]  No  Vital Signs Last 24 Hrs  T(C): 36.8 (07 Oct 2017 07:56), Max: 36.8 (07 Oct 2017 07:56)  T(F): 98.2 (07 Oct 2017 07:56), Max: 98.2 (07 Oct 2017 07:56)  HR: 58 (07 Oct 2017 07:56) (58 - 73)  BP: 106/75 (07 Oct 2017 07:56) (102/77 - 118/79)  BP(mean): --  RR: 16 (07 Oct 2017 07:56) (16 - 17)  SpO2: 98% (07 Oct 2017 07:56) (97% - 100%)      .GENERAL: The patient is a well-developed, well-nourished _____in no apparent distress.     HEENT: Head is normocephalic and atraumatic. Extraocular muscles are intact. Mucous membranes are moist.     NECK: Supple. jvd [  ]  Yes  [x  ]  No    LUNGS: Clear to auscultation  [x  ]  Yes  [  ]  No                               wheezing, [  ]  Yes  [ x ]  No                                      rales  [  ]  Yes  [ x ]  No                                    rhonchi  [  ]  Yes  [ x ]  No                 Respirations labored  [  ]  Yes  [ x ]  No    HEART: Regular rate and rhythm  [  x]  Yes  [  ]  No                                        Murmur [  ]  Yes  [x  ]  No                                              rub  [  ]  Yes  [ x ]  No                                          gallop  [  ]  Yes  [ x ]  No    ABDOMEN:                                 Soft, [x  ]  Yes  [  ]  No                                             nontender [ x ]  Yes  [  ]  No                                             distended.[ x ]  Yes  [  ]  No                            hepatosplenomegaly ,[ x ]  Yes  [  ]  No                                                    BS   x[  ]  Yes  [  ]  No    EXTREMITIES:                cyanosis, [  ]  Yes  [  x]  No                                       clubbing,   [  ]  Yes  [x  ]  No                                               rash, [  ]  Yes  [ x ]  No                                           edema.  [  ]  Yes  [x  ]  No    NEUROLOGIC: Alert and Oriented  [ x ] Person [x  ] Time  [x ] Place                                    Cranial nerves intact    [x  ]  Yes  [  ]  No                                               sensory Intact  [ x ]  Yes  [  ]  No                                                  motor WNL   [  x]  Yes  [  ]  No                                                Ariel Paresis  [  ]  Yes  [  x]  No  DTR  babinski+ or -    LABS:                        14.4   7.2   )-----------( 188      ( 07 Oct 2017 05:51 )             43.7     10-07    137  |  103  |  12  ----------------------------<  102<H>  4.1   |  29  |  0.71    Ca    8.5      07 Oct 2017 05:51  Phos  3.4     10-07  Mg     2.0     10-07                            LABS:                        15.4   7.2   )-----------( 212      ( 05 Oct 2017 07:51 )             48.0     10-05    137  |  101  |  13  ----------------------------<  102<H>  3.7   |  31  |  0.78    Ca    8.9      05 Oct 2017 07:51  Phos  3.3     10-05  Mg     1.9     10-05    TPro  7.7  /  Alb  4.3  /  TBili  0.3  /  DBili  x   /  AST  29  /  ALT  40  /  AlkPhos  63  10-03    PT/INR - ( 03 Oct 2017 17:32 )   PT: 10.8 sec;   INR: 0.99 ratio         PTT - ( 03 Oct 2017 17:32 )  PTT:28.7 sec      CARDIAC MARKERS ( 04 Oct 2017 00:15 )  <0.015 ng/mL / x     / 44 U/L / x     / 1.2 ng/mL  CARDIAC MARKERS ( 03 Oct 2017 17:32 )  <0.015 ng/mL / x     / 52 U/L / x     / x                    MICROBIOLOGY:    RADIOLOGY & ADDITIONAL STUDIES:< from: MRI Cervical Spine w/Cont (10.05.17 @ 10:54) >    IMPRESSION:  Abnormally enhancing lesion in the dorsal cord at C1 level. Recommend   clinical correlation for demyelinating disease.        < end of copied text >      CXR:    Ct scan chest:    ekg;    echo:

## 2017-10-07 NOTE — PROGRESS NOTE ADULT - ASSESSMENT
52-year old gentleman presented with right arm/hand weakness. He had similar symptoms 5 yrs ago was treated with steroid and he got better but never got any definitive diagnosis. He also had Lp done but result is not available. his MRI-brain shows mild perivetricular white matter changes and MRi-c spine shows c1 level SONA enhancing lesion most likely demylination from MS.   Pt declined LP-CSF therefor we will treat him with IV steroid for 3 days and then oral tapering dose of steroid. then he would f/up visit with MS neurologist for further care. I strongly  recommended him that he must take immunomodulating medication to prevent future attack of MS. pt understood my explanation very well.

## 2017-10-08 LAB
ANION GAP SERPL CALC-SCNC: 10 MMOL/L — SIGNIFICANT CHANGE UP (ref 5–17)
BUN SERPL-MCNC: 14 MG/DL — SIGNIFICANT CHANGE UP (ref 7–18)
CALCIUM SERPL-MCNC: 9.3 MG/DL — SIGNIFICANT CHANGE UP (ref 8.4–10.5)
CHLORIDE SERPL-SCNC: 103 MMOL/L — SIGNIFICANT CHANGE UP (ref 96–108)
CO2 SERPL-SCNC: 25 MMOL/L — SIGNIFICANT CHANGE UP (ref 22–31)
CREAT SERPL-MCNC: 0.6 MG/DL — SIGNIFICANT CHANGE UP (ref 0.5–1.3)
GLUCOSE SERPL-MCNC: 128 MG/DL — HIGH (ref 70–99)
HCT VFR BLD CALC: 44.1 % — SIGNIFICANT CHANGE UP (ref 39–50)
HGB BLD-MCNC: 15 G/DL — SIGNIFICANT CHANGE UP (ref 13–17)
MAGNESIUM SERPL-MCNC: 2.1 MG/DL — SIGNIFICANT CHANGE UP (ref 1.6–2.6)
MCHC RBC-ENTMCNC: 32.7 PG — SIGNIFICANT CHANGE UP (ref 27–34)
MCHC RBC-ENTMCNC: 34.1 GM/DL — SIGNIFICANT CHANGE UP (ref 32–36)
MCV RBC AUTO: 95.9 FL — SIGNIFICANT CHANGE UP (ref 80–100)
PHOSPHATE SERPL-MCNC: 3.6 MG/DL — SIGNIFICANT CHANGE UP (ref 2.5–4.5)
PLATELET # BLD AUTO: 202 K/UL — SIGNIFICANT CHANGE UP (ref 150–400)
POTASSIUM SERPL-MCNC: 4 MMOL/L — SIGNIFICANT CHANGE UP (ref 3.5–5.3)
POTASSIUM SERPL-SCNC: 4 MMOL/L — SIGNIFICANT CHANGE UP (ref 3.5–5.3)
RBC # BLD: 4.6 M/UL — SIGNIFICANT CHANGE UP (ref 4.2–5.8)
RBC # FLD: 12.2 % — SIGNIFICANT CHANGE UP (ref 10.3–14.5)
SODIUM SERPL-SCNC: 138 MMOL/L — SIGNIFICANT CHANGE UP (ref 135–145)
WBC # BLD: 10.8 K/UL — HIGH (ref 3.8–10.5)
WBC # FLD AUTO: 10.8 K/UL — HIGH (ref 3.8–10.5)

## 2017-10-08 PROCEDURE — 99233 SBSQ HOSP IP/OBS HIGH 50: CPT

## 2017-10-08 RX ADMIN — ATORVASTATIN CALCIUM 40 MILLIGRAM(S): 80 TABLET, FILM COATED ORAL at 22:24

## 2017-10-08 RX ADMIN — Medication 81 MILLIGRAM(S): at 12:23

## 2017-10-08 RX ADMIN — PANTOPRAZOLE SODIUM 40 MILLIGRAM(S): 20 TABLET, DELAYED RELEASE ORAL at 06:10

## 2017-10-08 RX ADMIN — Medication 64.5 MILLIGRAM(S): at 06:10

## 2017-10-08 NOTE — PROGRESS NOTE ADULT - NSHPATTENDINGPLANDISCUSS_GEN_ALL_CORE
resident and patient
pt
Resident
Resident Physician
resident  and patient
resident and patient
resident dr cunningham  and patient

## 2017-10-08 NOTE — PROGRESS NOTE ADULT - ASSESSMENT
1) Relapsing and Remitting MS( based up on time and space criteria)  On IV solumedrol for three days follows by PO tapering dose for a week.

## 2017-10-08 NOTE — PROGRESS NOTE ADULT - PROBLEM SELECTOR PLAN 3
continue iv steroid
discussed with dr bonner  patient refused lp  start iv solumedrol
meds as per neurology
VTE score1   no dvt or GI prophylaxis needed
VTE score1   no dvt or GI prophylaxis needed

## 2017-10-08 NOTE — PROGRESS NOTE ADULT - SUBJECTIVE AND OBJECTIVE BOX
PATTI ROONEY  MRN-[id number]    Patient is a 52y old  Male who presents with a chief complaint of numbness, tingling and paraesthesia of the right side of the body (04 Oct 2017 07:11)      HISTORY OF PRESENT ILLNESS:  s still has rt sided paresthesia   .but better  MEDICATIONS  (STANDING):  aspirin  chewable 81 milliGRAM(s) Oral daily  atorvastatin 40 milliGRAM(s) Oral at bedtime  methylPREDNISolone sodium succinate IVPB 1000 milliGRAM(s) IV Intermittent daily  pantoprazole    Tablet 40 milliGRAM(s) Oral before breakfast    MEDICATIONS  (PRN):  acetaminophen   Tablet. 650 milliGRAM(s) Oral every 6 hours PRN Moderate Pain (4 - 6)      No Known Allergies    Intolerances        PAST MEDICAL & SURGICAL HISTORY:  No pertinent past medical history  No significant past surgical history      FAMILY HISTORY:      SOCIAL HISTORY  Smoking History:     REVIEW OF SYSTEMS:    CONSTITUTIONAL:  Fevers [  ]  Yes  [x  ]  No                                   chills [  ]  Yes  [x  ]  No                               sweats  [  ]  Yes  [x  ]  No                                fatigue [  ]  Yes  [x  ]  No    HEENT:  Eyes:  Diplopia or blurred vision [  ]  Yes  [  ]  No    ENT:                        earache  [  ]  Yes  [x  ]  No                             sore throat  [  ]  Yes  [ x ]  No                            runny nose.  [  ]  Yes  [ x ]  No    CARDIOVASCULAR:       chest pain  [  ]  Yes  [ x ]  No                        squeezing, tightness,  [  ]  Yes  [x  ]  No                                      palpitations.  [  ]  Yes  [ x ]  No    RESPIRATORY:             Cough [  ]  Yes  [ x ]  No                                  Wheezing [  ]  Yes  [ x ]  No                                Hemoptysis [  ]  Yes  [  x]  No                                      Sputum [  ]  Yes  [ x ]  No                   Shortness of Breathe [  ]  Yes  [ x ]  No    GASTROINTESTINAL:      Abdominal pain  [  ]  Yes  [ x ]  No                                                    Nausea  [  ]  Yes  [x  ]  No                                                   Vomiting [  ]  Yes  [ x ]  No                                              Constipation [  ]  Yes  [ x ]  No                                                    Diarrhea [  ]  Yes  [ x ]  No    GENITOURINARY:           Incontinence [  ]  Yes  [  ]x  No                                                Dysuria [  ]  Yes  [ x ]  No                                      Blood in Urine  [  ]  Yes  [  x]  No                          Frequency or urgency.  [  ]  Yes  [ x ]  No    NEUROLOGIC:                     Paresthesias  [x  ]  Yes  [  ]  No, rt side of body                                             fasciculations  [  ]  Yes  [ x ]  No                                 weakness.  [- no                                                    Headache [  ]  Yes  [  ]  No                                  Loss of Conciousness [  ]  Yes  [  ]  No                                                     Insomnia [  ]  Yes  [  ]  No    PSYCHIATRIC:    Disorder of thought or mood.  [  ]  Yes  [  ]  No                                                           Anxiety [  ]  Yes  [  ]  No                                                      Depression [  ]  Yes  [  ]  No                                                         Dementia [  ]  Yes  [ x ]  No  .Vital Signs Last 24 Hrs  T(C): 36.8 (08 Oct 2017 05:40), Max: 36.8 (07 Oct 2017 13:51)  T(F): 98.3 (08 Oct 2017 05:40), Max: 98.3 (08 Oct 2017 05:40)  HR: 82 (08 Oct 2017 05:40) (61 - 82)  BP: 114/76 (08 Oct 2017 05:40) (99/65 - 119/73)  BP(mean): --  RR: 16 (08 Oct 2017 05:40) (16 - 16)  SpO2: 98% (08 Oct 2017 05:40) (95% - 98%)    .GENERAL: The patient is a well-developed, well-nourished _____in no apparent distress.     HEENT: Head is normocephalic and atraumatic. Extraocular muscles are intact. Mucous membranes are moist.     NECK: Supple. jvd [  ]  Yes  [x  ]  No    LUNGS: Clear to auscultation  [x  ]  Yes  [  ]  No                               wheezing, [  ]  Yes  [ x ]  No                                      rales  [  ]  Yes  [ x ]  No                                    rhonchi  [  ]  Yes  [ x ]  No                 Respirations labored  [  ]  Yes  [ x ]  No    HEART: Regular rate and rhythm  [  x]  Yes  [  ]  No                                        Murmur [  ]  Yes  [x  ]  No                                              rub  [  ]  Yes  [ x ]  No                                          gallop  [  ]  Yes  [ x ]  No    ABDOMEN:                                 Soft, [x  ]  Yes  [  ]  No                                             nontender [ x ]  Yes  [  ]  No                                             distended.[ x ]  Yes  [  ]  No                            hepatosplenomegaly ,[ x ]  Yes  [  ]  No                                                    BS   x[  ]  Yes  [  ]  No    EXTREMITIES:                cyanosis, [  ]  Yes  [  x]  No                                       clubbing,   [  ]  Yes  [x  ]  No                                               rash, [  ]  Yes  [ x ]  No                                           edema.  [  ]  Yes  [x  ]  No    NEUROLOGIC: Alert and Oriented  [ x ] Person [x  ] Time  [x ] Place                                    Cranial nerves intact    [x  ]  Yes  [  ]  No                                               sensory Intact  [ x ]  Yes  [  ]  No                                                  motor WNL   [  x]  Yes  [  ]  No                                                Ariel Paresis  [  ]  Yes  [  x]  No  DTR  babinski+ or -    LABS:                        14.4   7.2   )-----------( 188      ( 07 Oct 2017 05:51 )             43.7     10-07    137  |  103  |  12  ----------------------------<  102<H>  4.1   |  29  |  0.71    Ca    8.5      07 Oct 2017 05:51  Phos  3.4     10-07  Mg     2.0     10-07                            LABS:                        15.4   7.2   )-----------( 212      ( 05 Oct 2017 07:51 )             48.0     10-05    137  |  101  |  13  ----------------------------<  102<H>  3.7   |  31  |  0.78    Ca    8.9      05 Oct 2017 07:51  Phos  3.3     10-05  Mg     1.9     10-05    TPro  7.7  /  Alb  4.3  /  TBili  0.3  /  DBili  x   /  AST  29  /  ALT  40  /  AlkPhos  63  10-03    PT/INR - ( 03 Oct 2017 17:32 )   PT: 10.8 sec;   INR: 0.99 ratio         PTT - ( 03 Oct 2017 17:32 )  PTT:28.7 sec      CARDIAC MARKERS ( 04 Oct 2017 00:15 )  <0.015 ng/mL / x     / 44 U/L / x     / 1.2 ng/mL  CARDIAC MARKERS ( 03 Oct 2017 17:32 )  <0.015 ng/mL / x     / 52 U/L / x     / x                    MICROBIOLOGY:    RADIOLOGY & ADDITIONAL STUDIES:< from: MRI Cervical Spine w/Cont (10.05.17 @ 10:54) >    IMPRESSION:  Abnormally enhancing lesion in the dorsal cord at C1 level. Recommend   clinical correlation for demyelinating disease.        < end of copied text >      CXR:    Ct scan chest:    ekg;    echo:

## 2017-10-08 NOTE — PROGRESS NOTE ADULT - SUBJECTIVE AND OBJECTIVE BOX
Follow Up Visit Note:  Pt is seen and examined. He tolerated IV solumedrol very well. He feels 10-20% better in terms of right hand  weakness.    Allergies    No Known Allergies    Social History: Unchanged    MEDICATIONS  (STANDING):  aspirin  chewable 81 milliGRAM(s) Oral daily  atorvastatin 40 milliGRAM(s) Oral at bedtime  methylPREDNISolone sodium succinate IVPB 1000 milliGRAM(s) IV Intermittent daily  pantoprazole    Tablet 40 milliGRAM(s) Oral before breakfast    Review of Systems:  General: [ ] chills, [ ]fatigue, [ ] fevers  Skin: [ ] rash   HEENT: [ ] head injury, [ ] blurred vision, [ ] double vision, [ ] eye pain, [ ] visual loss, [ ] hearing loss, [ ] deafness, [ ] ear pain, [ ] ringing in the ears, [ ] vertigo, [ ] sinus pain, [ ] voice changes  Neck: [ ] neck stiffness  Respiratory: [ ] cough, [ ] difficulty breathing  Cardiovascular: [ ] calf cramps, [ ] chest pain, [ ] leg pain, [ ] swelling, [ ] rapid heart rate, [ ] shortness of breath  Gastrointestinal: [ ] abdominal pain, [ ] nausea, [ ] vomiting  Musculoskeletal: [ ] back pain, [ ] joint pain, [ ] joint stiffness, [ ] leg cramps, [ ] muscle atrophy, [ ] muscle cramps, [ ] muscle weakness, [ ] swelling of extremities  Neurological: [ ] Dizziness, [ ] decreased memory, [ ] fainting, [ ] focal neurological symptoms, [ ] headaches, [ ] incontinence of stool, [ ] incontinence of urine, [ ] loss of consciousness, [ ] numbness, [ ] seizures, [ ] spinning sensation, [ ] stroke, [ ] trouble walking, [ ] unsteadiness, [ ] visual changes, [x ] weakness- right arm at elbow and hand   Psychiatric: [ ] depression, [ ] anxiety, [ ] hallucinations, [ ] inability to concentrate, [ ] mood changes, [ ] panic attacks  Hematology: [ ] blood clots, [ ] spontaneous bleeding    Vital Signs  Vital Signs Last 24 Hrs  T(C): 36.8 (08 Oct 2017 05:40), Max: 36.8 (07 Oct 2017 13:51)  T(F): 98.3 (08 Oct 2017 05:40), Max: 98.3 (08 Oct 2017 05:40)  HR: 82 (08 Oct 2017 05:40) (73 - 82)  BP: 114/76 (08 Oct 2017 05:40) (103/71 - 119/73)  BP(mean): --  RR: 16 (08 Oct 2017 05:40) (16 - 16)  SpO2: 98% (08 Oct 2017 05:40) (95% - 98%)        Neurological Exam:    Mental Status -  Alert  Fund of Knowledge – normal  Affect- Appropriate  Recent Memory – Normal  Remote Memory – Normal  Attention Span – Normal  Concentration –  Normal  Cognitive function – Normal  Speech – Normal  Thought content/perception – Normal    Cranial Nerves:  II Optic: Visual acuity – Bilateral - Normal ; Visual fields – normal ; Fundi – Bilateral – no optic atrophy or Papilledema  III Oculomotor – normal bilaterally  IV Trochlear – Bilateral – normal  V Trigeminal: Ophthalmic – Bilateral – normal  ;   Maxillary – Bilateral- normal ;  Mandibular – Bilateral - normal  VI Abducens – Bilateral - normal  VII Facial: Normal bilaterally  VIII Acoustic - Bilateral – hearing normal and (hearing tested by finger rub)  IX Glossopharyngeal / X Vagus: Uvula – normal  XI Accessory: normal shoulder shrug  XII Hypoglossal – bilaterally normal  Eye Movements: Gaze – Bilateral - normal    Nystagmus – Bilateral – none  Motor:  Bulk and Contour: normal  Tone: normal  Strength: 5/5 normal muscle strength – all muscles except right elbow flexor and hand  weakness  General Assessment of Reflexes: Right Wrist - 2+  ;  Left Wrist - 2+ ; Right Elbow - 2+ ;  Left Elbow - 2+ ;  Right Knee - 2+ ;  Left Knee - 2+ ;   Right Ankle – 2+ ; Left Ankle – 2+  Plantar Reflexes (L4-S2) – Bilateral – Flexion  Sensory:  Light Touch: Intact – globally  Pain: Intact – globally  Temperature: Intact – globally  Coordination – no impairment of heel-to-shin, impairment of finger-to-nose or impairment of rapid alternating movement  Gait – Normal tandem walking, normal heel walking and normal toes walking  Romberg’s sign: - Normal

## 2017-10-08 NOTE — PROGRESS NOTE ADULT - ATTENDING COMMENTS
lyme titer positive but no clinical s/s of lyme
Pt will finish three days of IV solumedrol follows by PO tapering dose of prednisone for a week.  He will f/up with MS neurologist to discuss which best immunomodulating medication will be best for him to prevent future MS attacks.

## 2017-10-09 VITALS
DIASTOLIC BLOOD PRESSURE: 87 MMHG | SYSTOLIC BLOOD PRESSURE: 129 MMHG | RESPIRATION RATE: 16 BRPM | TEMPERATURE: 99 F | OXYGEN SATURATION: 94 % | HEART RATE: 77 BPM

## 2017-10-09 LAB
ANION GAP SERPL CALC-SCNC: 6 MMOL/L — SIGNIFICANT CHANGE UP (ref 5–17)
BUN SERPL-MCNC: 18 MG/DL — SIGNIFICANT CHANGE UP (ref 7–18)
CALCIUM SERPL-MCNC: 9.1 MG/DL — SIGNIFICANT CHANGE UP (ref 8.4–10.5)
CHLORIDE SERPL-SCNC: 106 MMOL/L — SIGNIFICANT CHANGE UP (ref 96–108)
CO2 SERPL-SCNC: 27 MMOL/L — SIGNIFICANT CHANGE UP (ref 22–31)
CREAT SERPL-MCNC: 0.57 MG/DL — SIGNIFICANT CHANGE UP (ref 0.5–1.3)
GLUCOSE SERPL-MCNC: 106 MG/DL — HIGH (ref 70–99)
HCT VFR BLD CALC: 41.4 % — SIGNIFICANT CHANGE UP (ref 39–50)
HGB BLD-MCNC: 14 G/DL — SIGNIFICANT CHANGE UP (ref 13–17)
MAGNESIUM SERPL-MCNC: 2.2 MG/DL — SIGNIFICANT CHANGE UP (ref 1.6–2.6)
MCHC RBC-ENTMCNC: 32.6 PG — SIGNIFICANT CHANGE UP (ref 27–34)
MCHC RBC-ENTMCNC: 34 GM/DL — SIGNIFICANT CHANGE UP (ref 32–36)
MCV RBC AUTO: 96.2 FL — SIGNIFICANT CHANGE UP (ref 80–100)
PHOSPHATE SERPL-MCNC: 3 MG/DL — SIGNIFICANT CHANGE UP (ref 2.5–4.5)
PLATELET # BLD AUTO: 192 K/UL — SIGNIFICANT CHANGE UP (ref 150–400)
POTASSIUM SERPL-MCNC: 3.9 MMOL/L — SIGNIFICANT CHANGE UP (ref 3.5–5.3)
POTASSIUM SERPL-SCNC: 3.9 MMOL/L — SIGNIFICANT CHANGE UP (ref 3.5–5.3)
RBC # BLD: 4.3 M/UL — SIGNIFICANT CHANGE UP (ref 4.2–5.8)
RBC # FLD: 12.2 % — SIGNIFICANT CHANGE UP (ref 10.3–14.5)
SODIUM SERPL-SCNC: 139 MMOL/L — SIGNIFICANT CHANGE UP (ref 135–145)
WBC # BLD: 20.3 K/UL — HIGH (ref 3.8–10.5)
WBC # FLD AUTO: 20.3 K/UL — HIGH (ref 3.8–10.5)

## 2017-10-09 PROCEDURE — 72141 MRI NECK SPINE W/O DYE: CPT

## 2017-10-09 PROCEDURE — 80061 LIPID PANEL: CPT

## 2017-10-09 PROCEDURE — 83735 ASSAY OF MAGNESIUM: CPT

## 2017-10-09 PROCEDURE — 85730 THROMBOPLASTIN TIME PARTIAL: CPT

## 2017-10-09 PROCEDURE — 70544 MR ANGIOGRAPHY HEAD W/O DYE: CPT

## 2017-10-09 PROCEDURE — 86617 LYME DISEASE ANTIBODY: CPT

## 2017-10-09 PROCEDURE — 80053 COMPREHEN METABOLIC PANEL: CPT

## 2017-10-09 PROCEDURE — 84100 ASSAY OF PHOSPHORUS: CPT

## 2017-10-09 PROCEDURE — 70450 CT HEAD/BRAIN W/O DYE: CPT

## 2017-10-09 PROCEDURE — 62270 DX LMBR SPI PNXR: CPT

## 2017-10-09 PROCEDURE — 85027 COMPLETE CBC AUTOMATED: CPT

## 2017-10-09 PROCEDURE — 87389 HIV-1 AG W/HIV-1&-2 AB AG IA: CPT

## 2017-10-09 PROCEDURE — 72142 MRI NECK SPINE W/DYE: CPT

## 2017-10-09 PROCEDURE — 93005 ELECTROCARDIOGRAM TRACING: CPT

## 2017-10-09 PROCEDURE — 82550 ASSAY OF CK (CPK): CPT

## 2017-10-09 PROCEDURE — 99285 EMERGENCY DEPT VISIT HI MDM: CPT | Mod: 25

## 2017-10-09 PROCEDURE — 82607 VITAMIN B-12: CPT

## 2017-10-09 PROCEDURE — 83036 HEMOGLOBIN GLYCOSYLATED A1C: CPT

## 2017-10-09 PROCEDURE — 86618 LYME DISEASE ANTIBODY: CPT

## 2017-10-09 PROCEDURE — 86803 HEPATITIS C AB TEST: CPT

## 2017-10-09 PROCEDURE — 70551 MRI BRAIN STEM W/O DYE: CPT

## 2017-10-09 PROCEDURE — 82553 CREATINE MB FRACTION: CPT

## 2017-10-09 PROCEDURE — 99233 SBSQ HOSP IP/OBS HIGH 50: CPT

## 2017-10-09 PROCEDURE — 93306 TTE W/DOPPLER COMPLETE: CPT

## 2017-10-09 PROCEDURE — 85610 PROTHROMBIN TIME: CPT

## 2017-10-09 PROCEDURE — 84484 ASSAY OF TROPONIN QUANT: CPT

## 2017-10-09 PROCEDURE — 80048 BASIC METABOLIC PNL TOTAL CA: CPT

## 2017-10-09 PROCEDURE — 71046 X-RAY EXAM CHEST 2 VIEWS: CPT

## 2017-10-09 PROCEDURE — 87040 BLOOD CULTURE FOR BACTERIA: CPT

## 2017-10-09 PROCEDURE — 84443 ASSAY THYROID STIM HORMONE: CPT

## 2017-10-09 PROCEDURE — 82746 ASSAY OF FOLIC ACID SERUM: CPT

## 2017-10-09 RX ADMIN — Medication 81 MILLIGRAM(S): at 11:32

## 2017-10-09 RX ADMIN — PANTOPRAZOLE SODIUM 40 MILLIGRAM(S): 20 TABLET, DELAYED RELEASE ORAL at 06:20

## 2017-10-09 RX ADMIN — Medication 64.5 MILLIGRAM(S): at 06:20

## 2017-10-09 NOTE — PROGRESS NOTE ADULT - SUBJECTIVE AND OBJECTIVE BOX
INTERVAL HPI/OVERNIGHT EVENTS: feel better so going home and will follow up with MS specialist .   Vital Signs Last 24 Hrs  T(C): 36.9 (09 Oct 2017 05:57), Max: 37.1 (08 Oct 2017 20:21)  T(F): 98.4 (09 Oct 2017 05:57), Max: 98.7 (08 Oct 2017 20:21)  HR: 73 (09 Oct 2017 05:57) (73 - 87)  BP: 102/64 (09 Oct 2017 05:57) (102/64 - 119/76)  BP(mean): --  RR: 16 (09 Oct 2017 05:57) (16 - 16)  SpO2: 97% (09 Oct 2017 05:57) (97% - 98%)  I&O's Summary    MEDICATIONS  (STANDING):  aspirin  chewable 81 milliGRAM(s) Oral daily  atorvastatin 40 milliGRAM(s) Oral at bedtime  pantoprazole    Tablet 40 milliGRAM(s) Oral before breakfast    MEDICATIONS  (PRN):  acetaminophen   Tablet. 650 milliGRAM(s) Oral every 6 hours PRN Moderate Pain (4 - 6)    LABS:                        14.0   20.3  )-----------( 192      ( 09 Oct 2017 07:57 )             41.4     10-09    139  |  106  |  18  ----------------------------<  106<H>  3.9   |  27  |  0.57    Ca    9.1      09 Oct 2017 07:57  Phos  3.0     10-09  Mg     2.2     10-09          CAPILLARY BLOOD GLUCOSE          Consultant(s) Notes Reviewed:  [x ] YES  [ ] NO    PHYSICAL EXAM:  GENERAL: NAD, well-groomed, well-developed, not in any distress ,  HEAD:  Atraumatic, Normocephalic  EYES: EOMI, PERRLA, conjunctiva and sclera clear  ENMT: No tonsillar erythema, exudates, or enlargement; Moist mucous membranes, Good dentition, No lesions  NECK: Supple, No JVD, Normal thyroid  NERVOUS SYSTEM:  Alert & Oriented X3,numbess,weakness rt upper extremity   CHEST/LUNG: Good air entry bilateral with no  rales, rhonchi, wheezing, or rubs  HEART: Regular rate and rhythm; No murmurs, rubs, or gallops  ABDOMEN: Soft, Nontender, Nondistended; Bowel sounds present  EXTREMITIES:  2+ Peripheral Pulses, No clubbing, cyanosis, or edema  SKIN: No rashes or lesions    Care Discussed with Consultants/Other Providers [ x] YES  [ ] NO

## 2017-10-09 NOTE — PROGRESS NOTE ADULT - ASSESSMENT
Left sided relapsing and remitting focal  neurological symptoms   MRI does not show any specific lesions / post contrast enhancement suggestive of active demyelinating lesions.   clinical history may favor demyelinating problem along with prior history of similar episode.  Today on day 3 of Solumedrol, the discharge on a tapered dose of steroid with GI prophylaxis  However I highly recommend he see an autoimmune neurologist/MS specialist to confirm that these episodes are related to demyelination.  Primary team may reach out to Dr. Dr.Dhanu Mcdonald office at [facility] for an outpatient evaluation. Left sided relapsing and remitting focal  neurological symptoms   MRI Brain does not show any specific lesions; no post contrast images available   clinical history may favor demyelinating problem along with prior history of similar episode Naima presents enhancing lesion in cervical spinal cord .  Today on day 3 of Solumedrol, the discharge on a tapered dose of steroid with GI prophylaxis  However I highly recommend he see an autoimmune neurologist/MS specialist to confirm that these episodes are related to demyelination.  Primary team may reach out to Dr. Dr.Dhanu Mcdonald office at [facility] for an outpatient evaluation. Left sided relapsing and remitting focal  neurological symptoms   MRI Brain does not show any specific lesions; no post contrast images available; lean outpatient MRI brain with and without contrast  clinical history may favor demyelinating problem along with prior history of similar episode Naima presents enhancing lesion in cervical spinal cord .  Today on day 3 of Solumedrol, the discharge on a tapered dose of steroid with GI prophylaxis  However I highly recommend he see an autoimmune neurologist/MS specialist to confirm that these episodes are related to demyelination.  Primary team may reach out to Dr. Dr.Dhanu Mcdonald office at [facility] for an outpatient evaluation. Left sided relapsing and remitting focal  neurological symptoms   MRI Brain does not show any specific lesions; no post contrast images available; lean outpatient MRI brain with and without contrast  clinical history may favor demyelinating problem along with prior history of similar episode Naima presents enhancing lesion in cervical spinal cord .  Today on day 3 of Solumedrol, the discharge on a tapered dose of steroid with GI prophylaxis  However I highly recommend he see an autoimmune neurologist/MS specialist to confirm that these episodes are related to demyelination.  Primary team may reach out to Dr. Dr.Dhanu Mcdonald office at [facility] OR Dr. Tien Quiles for an outpatient evaluation.

## 2017-10-09 NOTE — PROGRESS NOTE ADULT - SUBJECTIVE AND OBJECTIVE BOX
HPI:  51 y/o M with no significant PMHx presents to ED c/o worsening numbness, tingling and weakness on R side x 3 days. Pt was seen here 3 days ago for tingling in R hand ct head was neg for stroke patient was  discharged w/ steroid medication he has been compliant with. Pt now has numbness and tingling in his R arm, shoulder, side, and thigh as well w/ some weakness in his R hand - pt is unable to make a fist. patient had similar episode 5 years ago, resolved after steroids.  Pt denies any trauma. Denies any chest pain, SOB, incontinence, back pain, neck pain, fever, visual changes, facial involvement or any other complaints. 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 patient is an active smoker 1 PPD since 10 years Denies ever having had symptoms of withdrawal. (03 Oct 2017 20:53)      SUBJECTIVE: No events overnight.  No new neurologic complaints.      MEDICATIONS:   Antibiotics:     Neuro:   acetaminophen   Tablet. 650 milliGRAM(s) Oral every 6 hours PRN    CV:     Pulm:     GI/:  pantoprazole    Tablet 40 milliGRAM(s) Oral before breakfast    Heme:   aspirin  chewable 81 milliGRAM(s) Oral daily    Other:   atorvastatin 40 milliGRAM(s) Oral at bedtime      PHYSICAL EXAM:   Vital Signs Last 24 Hrs  T(C): 36.5 (09 Oct 2017 13:30), Max: 37.1 (08 Oct 2017 20:21)  T(F): 97.7 (09 Oct 2017 13:30), Max: 98.7 (08 Oct 2017 20:21)  HR: 73 (09 Oct 2017 13:30) (73 - 74)  BP: 118/73 (09 Oct 2017 13:30) (102/64 - 118/73)  BP(mean): --  RR: 14 (09 Oct 2017 13:30) (14 - 16)  SpO2: 99% (09 Oct 2017 13:30) (97% - 99%)  General: No acute distress  HEENT: EOM intact, visual fields full  Abdomen: Soft, nontender, nondistended   Extremities: No edema    NEUROLOGICAL EXAM:  Mental status: Awake, alert, oriented x3, no aphasia, no neglect, normal memory   Cranial Nerves: No facial asymmetry, no nystagmus, no dysarthria, no dysphagia, tongue midline, shoulder shrug intact bilaterally.  Motor exam: Normal tone, no drift, normal fine finger movements.         [] Upper extremity                Delt       Bicep    Tricep                                                  R         5/5        5/5        5/5       5/5                                               L          5/5        5/5        5/5       5/5         [] Lower extremity               HF          KE          KF        DF         PF                                               R        5/5        5/5        5/5       5/5       5/5                                               L         5/5        5/5       5/5       5/5        5/5    Sensation: Intact to light touch   Coordination: No dysmetria, CELESTINE intact and symmetric bilaterally  Gait:deferred    LABS:                        14.0   20.3  )-----------( 192      ( 09 Oct 2017 07:57 )             41.4    10-09    139  |  106  |  18  ----------------------------<  106<H>  3.9   |  27  |  0.57    Ca    9.1      09 Oct 2017 07:57  Phos  3.0     10-09  Mg     2.2     10-09      Hemoglobin A1C, Whole Blood: 5.5 % (10-04 @ 09:31)      IMAGING: Reviewed by me.

## 2017-10-11 LAB
CULTURE RESULTS: SIGNIFICANT CHANGE UP
CULTURE RESULTS: SIGNIFICANT CHANGE UP
SPECIMEN SOURCE: SIGNIFICANT CHANGE UP
SPECIMEN SOURCE: SIGNIFICANT CHANGE UP

## 2017-10-13 DIAGNOSIS — G35 MULTIPLE SCLEROSIS: ICD-10-CM

## 2017-10-13 DIAGNOSIS — F17.200 NICOTINE DEPENDENCE, UNSPECIFIED, UNCOMPLICATED: ICD-10-CM

## 2017-10-13 DIAGNOSIS — R29.703 NIHSS SCORE 3: ICD-10-CM

## 2017-10-13 DIAGNOSIS — Z91.19 PATIENT'S NONCOMPLIANCE WITH OTHER MEDICAL TREATMENT AND REGIMEN: ICD-10-CM

## 2017-10-18 ENCOUNTER — APPOINTMENT (OUTPATIENT)
Dept: UROLOGY | Facility: CLINIC | Age: 52
End: 2017-10-18

## 2018-08-24 NOTE — ED PROVIDER NOTE - GASTROINTESTINAL, MLM
Abdomen soft, non-tender, no guarding. No Residual Tumor Seen Histology Text: There were no malignant cells seen in the sections examined.

## 2021-09-02 NOTE — PATIENT PROFILE ADULT. - MEDICATIONS BROUGHT TO HOSPITAL, PROFILE
Thank you for choosing the [facility], Gauri Jerome MD, and our team as your Neurology Specialists.  We actively use feedback to constantly improve and deliver the best care possible. To provide the best experience, we are collecting feedback from you on how we performed.  You may receive a survey in the mail to evaluate how we did. Please take a moment and share your thoughts.      If for any reason you feel that we did not meet your expectations or you want to share a positive experience, please give us a call. Your feedback helps us know how we are doing and what we can be doing better.    Office hours: 8:00 am to 4:30 pm, Monday - Friday  Phone: [phone]     yes

## 2024-04-21 NOTE — ED PROVIDER NOTE - MEDICAL DECISION MAKING DETAILS
9
Case was dw neuro on call Dr. Lorenzana, pt to have CT head and if wnl dc with medrol dose back and f/u with neuro.   12:08a- Pt feels better, no focal deficits. Pt is well appearing walking with normal gait, stable for discharge and follow up with medical doctor. Pt educated on care and need for follow up. Discussed anticipatory guidance and return precautions. Questions answered. I had a detailed discussion with the patient and/or guardian regarding the historical points, exam findings, and any diagnostic results supporting the discharge diagnosis.